# Patient Record
Sex: FEMALE | Race: OTHER | HISPANIC OR LATINO | Employment: UNEMPLOYED | URBAN - NONMETROPOLITAN AREA
[De-identification: names, ages, dates, MRNs, and addresses within clinical notes are randomized per-mention and may not be internally consistent; named-entity substitution may affect disease eponyms.]

---

## 2023-03-06 ENCOUNTER — TELEPHONE (OUTPATIENT)
Dept: PRIMARY CARE | Facility: CLINIC | Age: 53
End: 2023-03-06
Payer: COMMERCIAL

## 2023-03-06 DIAGNOSIS — E78.5 DYSLIPIDEMIA: Primary | ICD-10-CM

## 2023-03-06 DIAGNOSIS — Z13.5 DIABETIC RETINOPATHY SCREENING: ICD-10-CM

## 2023-03-06 RX ORDER — ATORVASTATIN CALCIUM 20 MG/1
20 TABLET, FILM COATED ORAL NIGHTLY
COMMUNITY
Start: 2019-12-09 | End: 2023-03-06 | Stop reason: SDUPTHER

## 2023-03-06 RX ORDER — ATORVASTATIN CALCIUM 20 MG/1
20 TABLET, FILM COATED ORAL NIGHTLY
Qty: 90 TABLET | Refills: 0 | Status: SHIPPED | OUTPATIENT
Start: 2023-03-06 | End: 2023-05-15 | Stop reason: SDUPTHER

## 2023-03-22 PROBLEM — H00.012 HORDEOLUM EXTERNUM OF RIGHT LOWER EYELID: Status: ACTIVE | Noted: 2023-03-22

## 2023-03-22 PROBLEM — G44.209 TENSION HEADACHE: Status: ACTIVE | Noted: 2023-03-22

## 2023-03-22 PROBLEM — J30.9 ALLERGIC RHINITIS: Status: ACTIVE | Noted: 2023-03-22

## 2023-03-22 PROBLEM — R10.11 ABDOMINAL PAIN, ACUTE, RIGHT UPPER QUADRANT: Status: ACTIVE | Noted: 2023-03-22

## 2023-03-22 PROBLEM — L30.9 DERMATITIS: Status: ACTIVE | Noted: 2023-03-22

## 2023-03-22 PROBLEM — E66.09 CLASS 1 OBESITY DUE TO EXCESS CALORIES WITH SERIOUS COMORBIDITY AND BODY MASS INDEX (BMI) OF 32.0 TO 32.9 IN ADULT: Status: ACTIVE | Noted: 2023-03-22

## 2023-03-22 PROBLEM — M99.08 SOMATIC DYSFUNCTION OF RIB: Status: ACTIVE | Noted: 2023-03-22

## 2023-03-22 PROBLEM — L29.9 ITCHING: Status: ACTIVE | Noted: 2023-03-22

## 2023-03-22 PROBLEM — J45.909 ALLERGY-INDUCED ASTHMA (HHS-HCC): Status: ACTIVE | Noted: 2023-03-22

## 2023-03-22 PROBLEM — R07.81 RIB PAIN: Status: ACTIVE | Noted: 2023-03-22

## 2023-03-22 PROBLEM — M94.0 COSTOCHONDRITIS: Status: ACTIVE | Noted: 2023-03-22

## 2023-03-22 PROBLEM — M77.12 LATERAL EPICONDYLITIS OF LEFT ELBOW: Status: ACTIVE | Noted: 2023-03-22

## 2023-03-22 PROBLEM — E66.811 CLASS 1 OBESITY DUE TO EXCESS CALORIES WITH SERIOUS COMORBIDITY AND BODY MASS INDEX (BMI) OF 32.0 TO 32.9 IN ADULT: Status: ACTIVE | Noted: 2023-03-22

## 2023-03-22 PROBLEM — J32.9 SINUSITIS: Status: ACTIVE | Noted: 2023-03-22

## 2023-03-22 PROBLEM — K21.9 ESOPHAGEAL REFLUX: Status: ACTIVE | Noted: 2023-03-22

## 2023-03-22 PROBLEM — R53.82 CHRONIC FATIGUE: Status: ACTIVE | Noted: 2023-03-22

## 2023-03-22 PROBLEM — E04.0 SIMPLE GOITER: Status: ACTIVE | Noted: 2023-03-22

## 2023-03-22 PROBLEM — E56.9 MULTIPLE VITAMIN DEFICIENCY: Status: ACTIVE | Noted: 2023-03-22

## 2023-03-22 PROBLEM — M12.9 ARTHROPATHY: Status: ACTIVE | Noted: 2023-03-22

## 2023-03-22 PROBLEM — G47.00 INSOMNIA: Status: ACTIVE | Noted: 2023-03-22

## 2023-03-22 PROBLEM — K11.20 SIALOADENITIS: Status: ACTIVE | Noted: 2023-03-22

## 2023-03-22 PROBLEM — M99.00 SOMATIC DYSFUNCTION OF OCCIPITOCERVICAL REGION: Status: ACTIVE | Noted: 2023-03-22

## 2023-03-22 PROBLEM — E83.52 HYPERCALCEMIA: Status: ACTIVE | Noted: 2023-03-22

## 2023-03-22 PROBLEM — E55.9 VITAMIN D DEFICIENCY: Status: ACTIVE | Noted: 2023-03-22

## 2023-03-22 PROBLEM — L20.9 ATOPIC DERMATITIS: Status: ACTIVE | Noted: 2023-03-22

## 2023-03-22 PROBLEM — E53.8 VITAMIN B12 DEFICIENCY: Status: ACTIVE | Noted: 2023-03-22

## 2023-03-22 PROBLEM — M65.342 TRIGGER RING FINGER OF LEFT HAND: Status: ACTIVE | Noted: 2023-03-22

## 2023-03-22 PROBLEM — E78.5 DYSLIPIDEMIA: Status: ACTIVE | Noted: 2023-03-22

## 2023-03-22 PROBLEM — E78.49 FAMILIAL COMBINED HYPERLIPIDEMIA: Status: ACTIVE | Noted: 2023-03-22

## 2023-03-22 PROBLEM — H10.9 CONJUNCTIVITIS: Status: ACTIVE | Noted: 2023-03-22

## 2023-03-22 PROBLEM — E11.9 DIABETES MELLITUS (MULTI): Status: ACTIVE | Noted: 2023-03-22

## 2023-03-22 PROBLEM — Z78.9 KNOWN MEDICAL PROBLEMS: Status: ACTIVE | Noted: 2023-03-22

## 2023-03-22 PROBLEM — M19.90 OSTEOARTHROSIS: Status: ACTIVE | Noted: 2023-03-22

## 2023-03-22 PROBLEM — M99.07 SOMATIC DYSFUNCTION OF RIGHT UPPER EXTREMITY: Status: ACTIVE | Noted: 2023-03-22

## 2023-03-22 PROBLEM — M25.511 RIGHT SHOULDER PAIN: Status: ACTIVE | Noted: 2023-03-22

## 2023-03-22 PROBLEM — H92.01 RIGHT EAR PAIN: Status: ACTIVE | Noted: 2023-03-22

## 2023-03-22 PROBLEM — L25.9 CONTACT DERMATITIS: Status: ACTIVE | Noted: 2023-03-22

## 2023-03-22 RX ORDER — HYDROXYZINE HYDROCHLORIDE 10 MG/1
10 TABLET, FILM COATED ORAL 3 TIMES DAILY PRN
COMMUNITY
End: 2023-05-15 | Stop reason: ALTCHOICE

## 2023-03-22 RX ORDER — ERGOCALCIFEROL 1.25 MG/1
CAPSULE ORAL
COMMUNITY
Start: 2021-01-27 | End: 2023-05-15 | Stop reason: ALTCHOICE

## 2023-03-22 RX ORDER — CALCIUM CITRATE/VITAMIN D3 200MG-6.25
TABLET ORAL
COMMUNITY
Start: 2018-09-26

## 2023-03-22 RX ORDER — BACLOFEN 20 MG/1
TABLET ORAL NIGHTLY
COMMUNITY
End: 2023-05-15 | Stop reason: ALTCHOICE

## 2023-03-27 NOTE — PROGRESS NOTES
Subjective     Elizabeth Fragoso is a 52 y.o. female who presents for No chief complaint on file..  This was completed via telephone due to the restrictions of the COVID-19 pandemic.  All issues as below were discussed and addressed but no physical exam was performed.  If it was felt that the patient should be evaluated in clinic then they were director there.  The patient/parent verbally consented to the visit.     HPI  The patient is a 52 year-old female presenting to the clinic complaining  Telemedicine.  Telephone call.  Complaining of congestion sinus pressure facial pain headache and low-grade fever.  Sneezing and clearing throat.  Home COVID test was negative.  Denies shortness of breath.  Over-the-counter medications none.    Review of Systems  Review of systems  General.  Denies fever.  Denies chills.  HEENT dictated as above respiratory.  Dictated as above  Cardiovascular.  Denies chest pain.  Denies heart palpitations.  Denies shortness of breath.    Gastrointestinal.  Denies nausea vomiting diarrhea.  Denies abdominal pain.    Genitourinary denies burning urination.  Denies frequent urination.  Denies flank pain.  Denies blood in the urine.  Denies abnormal vaginal discharge.    Neurology.  Denies tingling numbness but denies weakness.  Denies headache.  Denies blurred vision.    Musculoskeletal.  Denies body aches.  Denies joint pains.  Denies muscle aches.  Denies muscle weakness    Endocrinology.  Denies cold intolerance.  Denies hot intolerance.    Psychiatric.  Denies depression.  Denies anxiety.  Denies suicidal.  Denies homicidal.                  Objective   Virtual visit  Physical Exam  Virtual visit    Assessment/Plan   1.  Allergic rhinitis.  Educated on allergic rhinitis.  Advised to increase oral fluid intake.    2.  Acute sinusitis.  Maxillary sinusitis.  Advised to increase oral fluid intake.  Explained adverse effects of medication.  Advised to call 911 or to go to the emergency room as  soon as possible if develops high fever or shortness of breath.        Problem List Items Addressed This Visit    None      Scribe Attestation  By signing my name below, I, Pradip Martin   attest that this documentation has been prepared under the direction and in the presence of Bobby Og MD.   .

## 2023-03-28 ENCOUNTER — TELEMEDICINE (OUTPATIENT)
Dept: PRIMARY CARE | Facility: CLINIC | Age: 53
End: 2023-03-28
Payer: COMMERCIAL

## 2023-03-28 DIAGNOSIS — J30.89 NON-SEASONAL ALLERGIC RHINITIS DUE TO OTHER ALLERGIC TRIGGER: Primary | ICD-10-CM

## 2023-03-28 DIAGNOSIS — J01.01 ACUTE RECURRENT MAXILLARY SINUSITIS: ICD-10-CM

## 2023-03-28 PROBLEM — H52.13 MYOPIA WITH PRESBYOPIA OF BOTH EYES: Status: ACTIVE | Noted: 2018-05-03

## 2023-03-28 PROBLEM — S93.401A SPRAIN OF RIGHT ANKLE: Status: ACTIVE | Noted: 2017-02-08

## 2023-03-28 PROBLEM — H52.4 MYOPIA WITH PRESBYOPIA OF BOTH EYES: Status: ACTIVE | Noted: 2018-05-03

## 2023-03-28 PROCEDURE — 99212 OFFICE O/P EST SF 10 MIN: CPT | Performed by: FAMILY MEDICINE

## 2023-03-28 RX ORDER — FLUTICASONE PROPIONATE 50 MCG
2 SPRAY, SUSPENSION (ML) NASAL DAILY
Qty: 16 G | Refills: 0 | Status: SHIPPED | OUTPATIENT
Start: 2023-03-28 | End: 2023-05-15 | Stop reason: ALTCHOICE

## 2023-03-28 RX ORDER — CEPHALEXIN 500 MG/1
500 CAPSULE ORAL 3 TIMES DAILY
Qty: 30 CAPSULE | Refills: 0 | Status: SHIPPED | OUTPATIENT
Start: 2023-03-28 | End: 2023-04-07

## 2023-05-12 NOTE — PROGRESS NOTES
Subjective     Elizabeth Fragoso is a 52 y.o. female who presents for Follow-up (ER follow up).      HPI  The patient is a 52 year-old female presenting to the clinic for an ER follow up.  Continue to monitor glucose levels. Educated on diabetes, low-calorie diet and exercise. Recommended eye exam once a year. Educated on diabetic foot care.  Complete blood work ordered 5/15/2023. I have educated the patient on fatigue. The patient denies restless leg syndrome and denies obstructive sleep apnea. Educated on vitamin D deficiency. She is up to date on Colonoscopy.  Order placed for Mammogram 5/15/2023.  Discussed medication.  Take as directed.  Complete blood work after fasting for 10 hours.  Follow up in office.     Educated on type 2 diabetes and diet exercise.  Advised to check blood sugars least twice daily.  Recommend eye exam once a year.  Educated on dyslipidemia and diet and exercise.  Complaining feeling tired.  Advised on diet exercise.  Discussed screening blood work for screening for condition.  Educated on vitamin D deficiency.  Educated on obesity and diet and exercise.  Advised to lose weight.  Discussed mammogram and we will follow-up on the mammogram up-to-date on colonoscopy.  Goes to the podiatrist goes to the ophthalmologist              Review of Systems  Review of systems:    General:  Denies fever.  Denies chills.    HEENT: Denies nasal congestion.  Denies sinus pressure.    Respiratory:  Denies cough.  Denies shortness of breath.    Cardiovascular:  Denies chest pain.  Denies heart palpitations.  Denies shortness of breath.    Gastrointestinal:  Denies nausea vomiting diarrhea.  Denies abdominal pain.    Genitourinary: Denies burning urination.  Denies frequent urination.  Denies flank pain.  Denies blood in the urine.  Denies abnormal vaginal discharge.    Neurology:  Denies tingling numbness but denies weakness.  Denies headache.  Denies blurred vision.    Musculoskeletal:  Denies body aches.   "Denies joint pains.  Denies muscle aches.  Denies muscle weakness    Endocrinology: Dictated as above.    Psychiatric:  Denies depression.  Denies anxiety.  Denies suicidal.  Denies homicidal.      Objective   /78 (BP Location: Left arm, Patient Position: Sitting, BP Cuff Size: Adult)   Pulse 76   Ht 1.626 m (5' 4\")   Wt 83 kg (183 lb)   SpO2 98%   BMI 31.41 kg/m²          Physical Exam  General.  Not in distress.  HEENT normocephalic anicteric sclerae.  Neck soft supple no thyromegaly.  No carotid bruit.  Lungs are clear.  Heart regular.  Abdomen soft nontender nondistended bowel sounds are positive.  Extremities no clubbing cyanosis or edema.  Psychiatric.  Has good eye contact.  No crying spells noted.  Speech was normal.  Denies depression.  Denies suicidal.  Denies homicidal.  Foot exam.  Bilateral foot exam.  No rashes noted.  No lesions noted.  No ulcers noted.  No onychomycosis noted.  Sensation was intact with a monofilament.  Bilateral posterior tibial and dorsalis pedis arteries are palpable      Assessment/Plan     1.  Type 2 diabetes.  Uncontrolled.  Educated on diet exercise but advised to check blood sugars at least twice daily.  Recommend eye exam once a year.  Educated on diabetic foot care.  We will continue monitor.    2.  Dyslipidemia.  Educated on diet exercise we will continue monitor    3.  Fatigue.  Educated on diet exercise.  Advised to lose weight.    4..  Vitamin D deficiency D deficiency we will continue monitor    5.  Obesity.  Educated on diet exercise.  Advised to lose weight.  We will continue monitor weight loss                Problem List Items Addressed This Visit          Endocrine/Metabolic    Diabetes mellitus (CMS/[facility]) - Primary    Relevant Medications    canagliflozin (Invokana) 300 mg    FreeStyle Bubba sensor system (FreeStyle Bubba 2 Sensor) kit    Other Relevant Orders    Urinalysis with Reflex Microscopic    Hemoglobin A1C    Albumin , Urine Random    Vitamin D " deficiency    Relevant Orders    Vitamin D 25-Hydroxy,Total    Class 1 obesity due to excess calories with serious comorbidity and body mass index (BMI) of 32.0 to 32.9 in adult       Other    Chronic fatigue    Relevant Orders    CBC and Auto Differential    Tsh With Reflex To Free T4 If Abnormal    Vitamin B12    Folate    Dyslipidemia    Relevant Medications    atorvastatin (Lipitor) 20 mg tablet    Other Relevant Orders    Comprehensive metabolic panel    Lipid panel     Other Visit Diagnoses       Encounter for screening mammogram for malignant neoplasm of breast        Relevant Orders    BI mammo bilateral screening tomosynthesis    Screening for condition        Relevant Orders    HIV 1/2 antibodies, rapid    Hepatitis C antibody              Scribe Attestation  By signing my name below, Sarah RODRIGUEZ Scribe   attest that this documentation has been prepared under the direction and in the presence of Bobby Og MD.

## 2023-05-15 ENCOUNTER — LAB (OUTPATIENT)
Dept: LAB | Facility: LAB | Age: 53
End: 2023-05-15
Payer: COMMERCIAL

## 2023-05-15 ENCOUNTER — OFFICE VISIT (OUTPATIENT)
Dept: PRIMARY CARE | Facility: CLINIC | Age: 53
End: 2023-05-15
Payer: COMMERCIAL

## 2023-05-15 VITALS
BODY MASS INDEX: 31.24 KG/M2 | SYSTOLIC BLOOD PRESSURE: 119 MMHG | HEIGHT: 64 IN | HEART RATE: 76 BPM | OXYGEN SATURATION: 98 % | DIASTOLIC BLOOD PRESSURE: 78 MMHG | WEIGHT: 183 LBS

## 2023-05-15 DIAGNOSIS — E78.5 DYSLIPIDEMIA: ICD-10-CM

## 2023-05-15 DIAGNOSIS — E11.9 TYPE 2 DIABETES MELLITUS WITHOUT COMPLICATION, WITHOUT LONG-TERM CURRENT USE OF INSULIN (MULTI): ICD-10-CM

## 2023-05-15 DIAGNOSIS — E55.9 VITAMIN D DEFICIENCY: ICD-10-CM

## 2023-05-15 DIAGNOSIS — Z12.31 ENCOUNTER FOR SCREENING MAMMOGRAM FOR MALIGNANT NEOPLASM OF BREAST: ICD-10-CM

## 2023-05-15 DIAGNOSIS — E66.09 CLASS 1 OBESITY DUE TO EXCESS CALORIES WITH SERIOUS COMORBIDITY AND BODY MASS INDEX (BMI) OF 32.0 TO 32.9 IN ADULT: ICD-10-CM

## 2023-05-15 DIAGNOSIS — Z13.9 SCREENING FOR CONDITION: ICD-10-CM

## 2023-05-15 DIAGNOSIS — E11.9 TYPE 2 DIABETES MELLITUS WITHOUT COMPLICATION, WITHOUT LONG-TERM CURRENT USE OF INSULIN (MULTI): Primary | ICD-10-CM

## 2023-05-15 DIAGNOSIS — R53.82 CHRONIC FATIGUE: ICD-10-CM

## 2023-05-15 LAB
ALANINE AMINOTRANSFERASE (SGPT) (U/L) IN SER/PLAS: 24 U/L (ref 7–45)
ALBUMIN (G/DL) IN SER/PLAS: 3.9 G/DL (ref 3.4–5)
ALKALINE PHOSPHATASE (U/L) IN SER/PLAS: 80 U/L (ref 33–110)
ANION GAP IN SER/PLAS: 13 MMOL/L (ref 10–20)
APPEARANCE, URINE: ABNORMAL
ASPARTATE AMINOTRANSFERASE (SGOT) (U/L) IN SER/PLAS: 17 U/L (ref 9–39)
BACTERIA, URINE: ABNORMAL /HPF
BASOPHILS (10*3/UL) IN BLOOD BY AUTOMATED COUNT: 0.02 X10E9/L (ref 0–0.1)
BASOPHILS/100 LEUKOCYTES IN BLOOD BY AUTOMATED COUNT: 0.4 % (ref 0–2)
BILIRUBIN TOTAL (MG/DL) IN SER/PLAS: 0.4 MG/DL (ref 0–1.2)
BILIRUBIN, URINE: NEGATIVE
BLOOD, URINE: NEGATIVE
CALCIUM (MG/DL) IN SER/PLAS: 9.3 MG/DL (ref 8.6–10.3)
CARBON DIOXIDE, TOTAL (MMOL/L) IN SER/PLAS: 28 MMOL/L (ref 21–32)
CHLORIDE (MMOL/L) IN SER/PLAS: 103 MMOL/L (ref 98–107)
CHOLESTEROL (MG/DL) IN SER/PLAS: 185 MG/DL (ref 0–199)
CHOLESTEROL IN HDL (MG/DL) IN SER/PLAS: 78.2 MG/DL
CHOLESTEROL/HDL RATIO: 2.4
COLOR, URINE: YELLOW
CREATININE (MG/DL) IN SER/PLAS: 0.73 MG/DL (ref 0.5–1.05)
EOSINOPHILS (10*3/UL) IN BLOOD BY AUTOMATED COUNT: 0.14 X10E9/L (ref 0–0.7)
EOSINOPHILS/100 LEUKOCYTES IN BLOOD BY AUTOMATED COUNT: 2.5 % (ref 0–6)
ERYTHROCYTE DISTRIBUTION WIDTH (RATIO) BY AUTOMATED COUNT: 13.4 % (ref 11.5–14.5)
ERYTHROCYTE MEAN CORPUSCULAR HEMOGLOBIN CONCENTRATION (G/DL) BY AUTOMATED: 30.3 G/DL (ref 32–36)
ERYTHROCYTE MEAN CORPUSCULAR VOLUME (FL) BY AUTOMATED COUNT: 96 FL (ref 80–100)
ERYTHROCYTES (10*6/UL) IN BLOOD BY AUTOMATED COUNT: 4.55 X10E12/L (ref 4–5.2)
GFR FEMALE: >90 ML/MIN/1.73M2
GLUCOSE (MG/DL) IN SER/PLAS: 126 MG/DL (ref 74–99)
GLUCOSE, URINE: ABNORMAL MG/DL
HEMATOCRIT (%) IN BLOOD BY AUTOMATED COUNT: 43.5 % (ref 36–46)
HEMOGLOBIN (G/DL) IN BLOOD: 13.2 G/DL (ref 12–16)
HYALINE CASTS, URINE: ABNORMAL /LPF
IMMATURE GRANULOCYTES/100 LEUKOCYTES IN BLOOD BY AUTOMATED COUNT: 1.1 % (ref 0–0.9)
KETONES, URINE: NEGATIVE MG/DL
LDL: 80 MG/DL (ref 0–99)
LEUKOCYTE ESTERASE, URINE: ABNORMAL
LEUKOCYTES (10*3/UL) IN BLOOD BY AUTOMATED COUNT: 5.6 X10E9/L (ref 4.4–11.3)
LYMPHOCYTES (10*3/UL) IN BLOOD BY AUTOMATED COUNT: 1.65 X10E9/L (ref 1.2–4.8)
LYMPHOCYTES/100 LEUKOCYTES IN BLOOD BY AUTOMATED COUNT: 29.3 % (ref 13–44)
MONOCYTES (10*3/UL) IN BLOOD BY AUTOMATED COUNT: 0.37 X10E9/L (ref 0.1–1)
MONOCYTES/100 LEUKOCYTES IN BLOOD BY AUTOMATED COUNT: 6.6 % (ref 2–10)
MUCUS, URINE: ABNORMAL /LPF
NEUTROPHILS (10*3/UL) IN BLOOD BY AUTOMATED COUNT: 3.39 X10E9/L (ref 1.2–7.7)
NEUTROPHILS/100 LEUKOCYTES IN BLOOD BY AUTOMATED COUNT: 60.1 % (ref 40–80)
NITRITE, URINE: NEGATIVE
PH, URINE: 5 (ref 5–8)
PLATELETS (10*3/UL) IN BLOOD AUTOMATED COUNT: 291 X10E9/L (ref 150–450)
POTASSIUM (MMOL/L) IN SER/PLAS: 4.6 MMOL/L (ref 3.5–5.3)
PROTEIN TOTAL: 7.2 G/DL (ref 6.4–8.2)
PROTEIN, URINE: NEGATIVE MG/DL
RBC, URINE: 2 /HPF (ref 0–5)
SODIUM (MMOL/L) IN SER/PLAS: 139 MMOL/L (ref 136–145)
SPECIFIC GRAVITY, URINE: 1.03 (ref 1–1.03)
SQUAMOUS EPITHELIAL CELLS, URINE: 16 /HPF
THYROTROPIN (MIU/L) IN SER/PLAS BY DETECTION LIMIT <= 0.05 MIU/L: 1.15 MIU/L (ref 0.44–3.98)
TRANSITIONAL EPITHELIAL CELLS, URINE: <1 /HPF
TRIGLYCERIDE (MG/DL) IN SER/PLAS: 136 MG/DL (ref 0–149)
UREA NITROGEN (MG/DL) IN SER/PLAS: 11 MG/DL (ref 6–23)
UROBILINOGEN, URINE: <2 MG/DL (ref 0–1.9)
VLDL: 27 MG/DL (ref 0–40)
WBC, URINE: 11 /HPF (ref 0–5)

## 2023-05-15 PROCEDURE — 81001 URINALYSIS AUTO W/SCOPE: CPT

## 2023-05-15 PROCEDURE — 80061 LIPID PANEL: CPT

## 2023-05-15 PROCEDURE — 36415 COLL VENOUS BLD VENIPUNCTURE: CPT

## 2023-05-15 PROCEDURE — 3008F BODY MASS INDEX DOCD: CPT | Performed by: FAMILY MEDICINE

## 2023-05-15 PROCEDURE — 82607 VITAMIN B-12: CPT

## 2023-05-15 PROCEDURE — 83036 HEMOGLOBIN GLYCOSYLATED A1C: CPT

## 2023-05-15 PROCEDURE — 3078F DIAST BP <80 MM HG: CPT | Performed by: FAMILY MEDICINE

## 2023-05-15 PROCEDURE — 84443 ASSAY THYROID STIM HORMONE: CPT

## 2023-05-15 PROCEDURE — 99214 OFFICE O/P EST MOD 30 MIN: CPT | Performed by: FAMILY MEDICINE

## 2023-05-15 PROCEDURE — 82306 VITAMIN D 25 HYDROXY: CPT

## 2023-05-15 PROCEDURE — 82570 ASSAY OF URINE CREATININE: CPT

## 2023-05-15 PROCEDURE — 82043 UR ALBUMIN QUANTITATIVE: CPT

## 2023-05-15 PROCEDURE — 3051F HG A1C>EQUAL 7.0%<8.0%: CPT | Performed by: FAMILY MEDICINE

## 2023-05-15 PROCEDURE — 85025 COMPLETE CBC W/AUTO DIFF WBC: CPT

## 2023-05-15 PROCEDURE — 1036F TOBACCO NON-USER: CPT | Performed by: FAMILY MEDICINE

## 2023-05-15 PROCEDURE — 82746 ASSAY OF FOLIC ACID SERUM: CPT

## 2023-05-15 PROCEDURE — 3074F SYST BP LT 130 MM HG: CPT | Performed by: FAMILY MEDICINE

## 2023-05-15 PROCEDURE — 86803 HEPATITIS C AB TEST: CPT

## 2023-05-15 PROCEDURE — 80053 COMPREHEN METABOLIC PANEL: CPT

## 2023-05-15 PROCEDURE — 87389 HIV-1 AG W/HIV-1&-2 AB AG IA: CPT

## 2023-05-15 RX ORDER — ATORVASTATIN CALCIUM 20 MG/1
20 TABLET, FILM COATED ORAL NIGHTLY
Qty: 90 TABLET | Refills: 0 | Status: SHIPPED | OUTPATIENT
Start: 2023-05-15 | End: 2024-02-28 | Stop reason: SDUPTHER

## 2023-05-15 RX ORDER — FLASH GLUCOSE SENSOR
KIT MISCELLANEOUS
Qty: 2 EACH | Refills: 1 | Status: SHIPPED | OUTPATIENT
Start: 2023-05-15 | End: 2023-06-17

## 2023-05-15 ASSESSMENT — PAIN SCALES - GENERAL: PAINLEVEL: 0-NO PAIN

## 2023-05-16 LAB
ALBUMIN (MG/L) IN URINE: 7.2 MG/L
ALBUMIN/CREATININE (UG/MG) IN URINE: 10.4 UG/MG CRT (ref 0–30)
CALCIDIOL (25 OH VITAMIN D3) (NG/ML) IN SER/PLAS: 34 NG/ML
COBALAMIN (VITAMIN B12) (PG/ML) IN SER/PLAS: 383 PG/ML (ref 211–911)
CREATININE (MG/DL) IN URINE: 69.4 MG/DL (ref 20–320)
ESTIMATED AVERAGE GLUCOSE FOR HBA1C: 174 MG/DL
FOLATE (NG/ML) IN SER/PLAS: 14.4 NG/ML
HEMOGLOBIN A1C/HEMOGLOBIN TOTAL IN BLOOD: 7.7 %
HEPATITIS C VIRUS AB PRESENCE IN SERUM: NONREACTIVE
HIV 1/ 2 AG/AB SCREEN: NONREACTIVE

## 2023-09-13 LAB
ALANINE AMINOTRANSFERASE (SGPT) (U/L) IN SER/PLAS: 22 U/L (ref 7–45)
ALBUMIN (G/DL) IN SER/PLAS: 4.2 G/DL (ref 3.4–5)
ALKALINE PHOSPHATASE (U/L) IN SER/PLAS: 80 U/L (ref 33–110)
ANION GAP IN SER/PLAS: 11 MMOL/L (ref 10–20)
APPEARANCE, URINE: ABNORMAL
ASPARTATE AMINOTRANSFERASE (SGOT) (U/L) IN SER/PLAS: 19 U/L (ref 9–39)
BASOPHILS (10*3/UL) IN BLOOD BY AUTOMATED COUNT: 0.04 X10E9/L (ref 0–0.1)
BASOPHILS/100 LEUKOCYTES IN BLOOD BY AUTOMATED COUNT: 0.5 % (ref 0–2)
BILIRUBIN TOTAL (MG/DL) IN SER/PLAS: 0.5 MG/DL (ref 0–1.2)
BILIRUBIN, URINE: NEGATIVE
BLOOD, URINE: NEGATIVE
CALCIUM (MG/DL) IN SER/PLAS: 9.9 MG/DL (ref 8.6–10.3)
CARBON DIOXIDE, TOTAL (MMOL/L) IN SER/PLAS: 29 MMOL/L (ref 21–32)
CHLORIDE (MMOL/L) IN SER/PLAS: 102 MMOL/L (ref 98–107)
CHOLESTEROL (MG/DL) IN SER/PLAS: 186 MG/DL (ref 0–199)
CHOLESTEROL IN HDL (MG/DL) IN SER/PLAS: 73.8 MG/DL
CHOLESTEROL/HDL RATIO: 2.5
COLOR, URINE: YELLOW
CREATININE (MG/DL) IN SER/PLAS: 0.75 MG/DL (ref 0.5–1.05)
EOSINOPHILS (10*3/UL) IN BLOOD BY AUTOMATED COUNT: 0.08 X10E9/L (ref 0–0.7)
EOSINOPHILS/100 LEUKOCYTES IN BLOOD BY AUTOMATED COUNT: 1 % (ref 0–6)
ERYTHROCYTE DISTRIBUTION WIDTH (RATIO) BY AUTOMATED COUNT: 13.4 % (ref 11.5–14.5)
ERYTHROCYTE MEAN CORPUSCULAR HEMOGLOBIN CONCENTRATION (G/DL) BY AUTOMATED: 30.5 G/DL (ref 32–36)
ERYTHROCYTE MEAN CORPUSCULAR VOLUME (FL) BY AUTOMATED COUNT: 94 FL (ref 80–100)
ERYTHROCYTES (10*6/UL) IN BLOOD BY AUTOMATED COUNT: 4.46 X10E12/L (ref 4–5.2)
GFR FEMALE: >90 ML/MIN/1.73M2
GLUCOSE (MG/DL) IN SER/PLAS: 108 MG/DL (ref 74–99)
GLUCOSE, URINE: ABNORMAL MG/DL
HEMATOCRIT (%) IN BLOOD BY AUTOMATED COUNT: 42 % (ref 36–46)
HEMOGLOBIN (G/DL) IN BLOOD: 12.8 G/DL (ref 12–16)
IMMATURE GRANULOCYTES/100 LEUKOCYTES IN BLOOD BY AUTOMATED COUNT: 0.1 % (ref 0–0.9)
KETONES, URINE: ABNORMAL MG/DL
LDL: 96 MG/DL (ref 0–99)
LEUKOCYTE ESTERASE, URINE: ABNORMAL
LEUKOCYTES (10*3/UL) IN BLOOD BY AUTOMATED COUNT: 8.2 X10E9/L (ref 4.4–11.3)
LYMPHOCYTES (10*3/UL) IN BLOOD BY AUTOMATED COUNT: 2.2 X10E9/L (ref 1.2–4.8)
LYMPHOCYTES/100 LEUKOCYTES IN BLOOD BY AUTOMATED COUNT: 27 % (ref 13–44)
MONOCYTES (10*3/UL) IN BLOOD BY AUTOMATED COUNT: 0.55 X10E9/L (ref 0.1–1)
MONOCYTES/100 LEUKOCYTES IN BLOOD BY AUTOMATED COUNT: 6.7 % (ref 2–10)
MUCUS, URINE: ABNORMAL /LPF
NEUTROPHILS (10*3/UL) IN BLOOD BY AUTOMATED COUNT: 5.27 X10E9/L (ref 1.2–7.7)
NEUTROPHILS/100 LEUKOCYTES IN BLOOD BY AUTOMATED COUNT: 64.7 % (ref 40–80)
NITRITE, URINE: NEGATIVE
PH, URINE: 5 (ref 5–8)
PLATELETS (10*3/UL) IN BLOOD AUTOMATED COUNT: 285 X10E9/L (ref 150–450)
POTASSIUM (MMOL/L) IN SER/PLAS: 4.4 MMOL/L (ref 3.5–5.3)
PROTEIN TOTAL: 7.5 G/DL (ref 6.4–8.2)
PROTEIN, URINE: NEGATIVE MG/DL
RBC, URINE: <1 /HPF (ref 0–5)
SODIUM (MMOL/L) IN SER/PLAS: 138 MMOL/L (ref 136–145)
SPECIFIC GRAVITY, URINE: 1.03 (ref 1–1.03)
SQUAMOUS EPITHELIAL CELLS, URINE: 16 /HPF
THYROTROPIN (MIU/L) IN SER/PLAS BY DETECTION LIMIT <= 0.05 MIU/L: 2.82 MIU/L (ref 0.44–3.98)
TRIGLYCERIDE (MG/DL) IN SER/PLAS: 83 MG/DL (ref 0–149)
UREA NITROGEN (MG/DL) IN SER/PLAS: 18 MG/DL (ref 6–23)
UROBILINOGEN, URINE: <2 MG/DL (ref 0–1.9)
VLDL: 17 MG/DL (ref 0–40)
WBC, URINE: 22 /HPF (ref 0–5)

## 2023-09-14 LAB
ALBUMIN (MG/L) IN URINE: 11.8 MG/L
ALBUMIN/CREATININE (UG/MG) IN URINE: 10.9 UG/MG CRT (ref 0–30)
CALCIDIOL (25 OH VITAMIN D3) (NG/ML) IN SER/PLAS: 42 NG/ML
COBALAMIN (VITAMIN B12) (PG/ML) IN SER/PLAS: 414 PG/ML (ref 211–911)
CREATININE (MG/DL) IN URINE: 108 MG/DL (ref 20–320)
ESTIMATED AVERAGE GLUCOSE FOR HBA1C: 177 MG/DL
FOLATE (NG/ML) IN SER/PLAS: >24 NG/ML
HEMOGLOBIN A1C/HEMOGLOBIN TOTAL IN BLOOD: 7.8 %

## 2023-10-23 ENCOUNTER — ANCILLARY PROCEDURE (OUTPATIENT)
Dept: RADIOLOGY | Facility: CLINIC | Age: 53
End: 2023-10-23
Payer: COMMERCIAL

## 2023-10-23 ENCOUNTER — OFFICE VISIT (OUTPATIENT)
Dept: PRIMARY CARE | Facility: CLINIC | Age: 53
End: 2023-10-23
Payer: COMMERCIAL

## 2023-10-23 ENCOUNTER — LAB (OUTPATIENT)
Dept: LAB | Facility: LAB | Age: 53
End: 2023-10-23
Payer: COMMERCIAL

## 2023-10-23 VITALS
HEIGHT: 64 IN | BODY MASS INDEX: 29.88 KG/M2 | SYSTOLIC BLOOD PRESSURE: 137 MMHG | WEIGHT: 175 LBS | DIASTOLIC BLOOD PRESSURE: 86 MMHG | OXYGEN SATURATION: 96 % | HEART RATE: 75 BPM

## 2023-10-23 DIAGNOSIS — M25.561 RIGHT KNEE PAIN, UNSPECIFIED CHRONICITY: ICD-10-CM

## 2023-10-23 DIAGNOSIS — H60.91 OTITIS EXTERNA OF RIGHT EAR, UNSPECIFIED CHRONICITY, UNSPECIFIED TYPE: ICD-10-CM

## 2023-10-23 DIAGNOSIS — M25.552 BILATERAL HIP PAIN: ICD-10-CM

## 2023-10-23 DIAGNOSIS — E11.9 TYPE 2 DIABETES MELLITUS WITHOUT COMPLICATION, WITHOUT LONG-TERM CURRENT USE OF INSULIN (MULTI): ICD-10-CM

## 2023-10-23 DIAGNOSIS — E78.5 DYSLIPIDEMIA: ICD-10-CM

## 2023-10-23 DIAGNOSIS — M35.3 POLYMYALGIA (MULTI): ICD-10-CM

## 2023-10-23 DIAGNOSIS — M25.551 BILATERAL HIP PAIN: ICD-10-CM

## 2023-10-23 PROCEDURE — 3079F DIAST BP 80-89 MM HG: CPT | Performed by: INTERNAL MEDICINE

## 2023-10-23 PROCEDURE — 3008F BODY MASS INDEX DOCD: CPT | Performed by: INTERNAL MEDICINE

## 2023-10-23 PROCEDURE — 36415 COLL VENOUS BLD VENIPUNCTURE: CPT

## 2023-10-23 PROCEDURE — 72202 X-RAY EXAM SI JOINTS 3/> VWS: CPT | Performed by: RADIOLOGY

## 2023-10-23 PROCEDURE — 86038 ANTINUCLEAR ANTIBODIES: CPT

## 2023-10-23 PROCEDURE — 99214 OFFICE O/P EST MOD 30 MIN: CPT | Performed by: INTERNAL MEDICINE

## 2023-10-23 PROCEDURE — 72202 X-RAY EXAM SI JOINTS 3/> VWS: CPT

## 2023-10-23 PROCEDURE — 85652 RBC SED RATE AUTOMATED: CPT

## 2023-10-23 PROCEDURE — 73562 X-RAY EXAM OF KNEE 3: CPT | Mod: RT

## 2023-10-23 PROCEDURE — 86140 C-REACTIVE PROTEIN: CPT

## 2023-10-23 PROCEDURE — 86431 RHEUMATOID FACTOR QUANT: CPT

## 2023-10-23 PROCEDURE — 3051F HG A1C>EQUAL 7.0%<8.0%: CPT | Performed by: INTERNAL MEDICINE

## 2023-10-23 PROCEDURE — 73562 X-RAY EXAM OF KNEE 3: CPT | Mod: RIGHT SIDE | Performed by: RADIOLOGY

## 2023-10-23 PROCEDURE — 3075F SYST BP GE 130 - 139MM HG: CPT | Performed by: INTERNAL MEDICINE

## 2023-10-23 PROCEDURE — 1036F TOBACCO NON-USER: CPT | Performed by: INTERNAL MEDICINE

## 2023-10-23 RX ORDER — MELOXICAM 15 MG/1
15 TABLET ORAL DAILY
Qty: 30 TABLET | Refills: 0 | Status: SHIPPED | OUTPATIENT
Start: 2023-10-23 | End: 2023-11-22

## 2023-10-23 RX ORDER — LANOLIN ALCOHOL/MO/W.PET/CERES
500 CREAM (GRAM) TOPICAL DAILY
COMMUNITY

## 2023-10-23 RX ORDER — CIPROFLOXACIN AND DEXAMETHASONE 3; 1 MG/ML; MG/ML
4 SUSPENSION/ DROPS AURICULAR (OTIC) 2 TIMES DAILY
Qty: 4 ML | Refills: 0 | Status: SHIPPED | OUTPATIENT
Start: 2023-10-23 | End: 2023-11-07 | Stop reason: WASHOUT

## 2023-10-23 RX ORDER — FLASH GLUCOSE SENSOR
KIT MISCELLANEOUS
Qty: 2 EACH | Refills: 3 | Status: SHIPPED | OUTPATIENT
Start: 2023-10-23 | End: 2024-03-25

## 2023-10-23 ASSESSMENT — PATIENT HEALTH QUESTIONNAIRE - PHQ9
1. LITTLE INTEREST OR PLEASURE IN DOING THINGS: NOT AT ALL
SUM OF ALL RESPONSES TO PHQ9 QUESTIONS 1 AND 2: 0
2. FEELING DOWN, DEPRESSED OR HOPELESS: NOT AT ALL

## 2023-10-23 ASSESSMENT — ENCOUNTER SYMPTOMS
CARDIOVASCULAR NEGATIVE: 1
GASTROINTESTINAL NEGATIVE: 1
ARTHRALGIAS: 1
RESPIRATORY NEGATIVE: 1
NEUROLOGICAL NEGATIVE: 1

## 2023-10-23 NOTE — PROGRESS NOTES
Patient ID: She states that she has been having pain in bilateral hips, right shoulder which has persisted for about one month. She states it is painful to sit, stand and walk. She states the pain becomes very severe at times. She states her eldest son has diagnosis of lupus. She states that she had UTI symptoms about two months ago, took OTC medication and symptoms resolved. Denies any current urinary symptoms. She also reports pain in right ear, denies swimming.    HPI Elizabeth Fragoso is a 52 y.o. female with PMH remarkable for Type 2 DM, dyslipidemia, vitamin D deficiency who presents to the office today for Establish Care (Switching from joe) and Flu Vaccine (Pt declined at this time).    HEALTH MAINTENANCE: Establish Care  Previous PCP: Dr. Og  Smoking: never smoker  Mammogram (40-75): 5/24/23 WNL  Pap smear (21-65):   Labs: 9/13/23  Colonoscopy (45-75): 7/27/21  Lung cancer screening (55-80 + 30 pack year + smoking/quit in last 15 years): n/a  DEXA (65+, q 2 years): n/a    SOCIAL HISTORY:  Social History     Tobacco Use    Smoking status: Never    Smokeless tobacco: Never   Substance Use Topics    Alcohol use: Not Currently     Comment: occasionally    Drug use: Never     IMMUNIZATIONS:  Immunization History   Administered Date(s) Administered    Pfizer Purple Cap SARS-CoV-2 09/30/2021, 10/21/2021     REVIEW OF SYSTEMS:  Review of Systems   HENT:  Positive for ear pain.    Respiratory: Negative.     Cardiovascular: Negative.    Gastrointestinal: Negative.    Genitourinary: Negative.    Musculoskeletal:  Positive for arthralgias.   Neurological: Negative.      ALLERGIES:  No Known Allergies     VITAL SIGNS:  Vitals:    10/23/23 1512   BP: 137/86   Pulse: 75   SpO2: 96%     Physical Exam  Vitals reviewed.   Constitutional:       Appearance: Normal appearance.   HENT:      Head: Normocephalic and atraumatic.      Ears:      Comments: There was edema and erythema to right external ear, TM normal,  no drainage  Cardiovascular:      Rate and Rhythm: Normal rate and regular rhythm.      Pulses: Normal pulses.   Pulmonary:      Effort: Pulmonary effort is normal.      Breath sounds: Normal breath sounds.   Abdominal:      General: Bowel sounds are normal.      Palpations: Abdomen is soft.   Musculoskeletal:      Comments: OSWALDO produced pain in posterior part of hip and sacroiliac bone and there was also tenderness over sacroiliac bone bilaterally, no tenderness over spine  There was tenderness and pain with any ROM on left knee, no edema, no erythema or warmth  Tenderness and mild effusion over right knee   Neurological:      General: No focal deficit present.      Mental Status: She is alert and oriented to person, place, and time.   Psychiatric:         Mood and Affect: Mood normal.         Behavior: Behavior normal.     MEDICATIONS:  Current Outpatient Medications on File Prior to Visit   Medication Sig Dispense Refill    ascorbic acid (Vitamin C) 500 mg ER capsule Take 1 capsule (500 mg) by mouth once daily.      atorvastatin (Lipitor) 20 mg tablet Take 1 tablet (20 mg) by mouth once daily at bedtime. 90 tablet 0    canagliflozin (Invokana) 300 mg Take 1 tablet (300 mg) by mouth once daily. 90 tablet 0    FreeStyle Bubba sensor system (FreeStyle Bubba 2 Sensor) kit USE AS INSTRUCTED 2 each 0    blood sugar diagnostic (True Metrix Glucose Test Strip) strip USE 1 STRIP Daily       No current facility-administered medications on file prior to visit.      LABORATORY DATA:  Lab Results   Component Value Date    WBC 8.2 09/13/2023    HGB 12.8 09/13/2023    HCT 42.0 09/13/2023     09/13/2023    CHOL 186 09/13/2023    TRIG 83 09/13/2023    HDL 73.8 09/13/2023    ALT 22 09/13/2023    AST 19 09/13/2023     09/13/2023    K 4.4 09/13/2023     09/13/2023    CREATININE 0.75 09/13/2023    BUN 18 09/13/2023    CO2 29 09/13/2023    TSH 2.82 09/13/2023    HGBA1C 7.8 (A) 09/13/2023       ASSESSMENT AND  PLAN:  Assessment/Plan   Elizabeth Fragoso is a 52 y.o. female with PMH remarkable for Type 2 DM, dyslipidemia, vitamin D deficiency here to establish care and with complaint of bone pain  - reviewed most recent bloodwork from 09/13/23  - her HgbA1c on 09/13/23 was7.8  - continue with Invokana and blood sugar monitoring  - most recent lipid panel on 09/13/23 was normal , continue with atorvastatin  - she has complaint of right ear pain, exam consistent with external otitis media, start Ciprodex ear drops  - she has significant pain over sacroiliac joint, denies any trauma or falls  - she has a lot of joint pain, including bilateral knees and hips  - will get xrays of bilateral knees and sacroiliac area  - start on Meloxicam to help with pain, prescription sent in, advised to avoid NSAIDS while taking this medication  - will check inflammatory markers due to polyarthralgia, orders inputted for IFRAH, CRP, ESR, Rheumatoid factor  - Follow up in three months    --------------------  Written by Kristi Galindo LPN, acting as a scribe for Dr. Noel. This note accurately reflects the work and decisions made by Dr. Noel.     I, Dr. Noel, attest all medical record entries made by the scribe were under my direction and were personally dictated by me. I have reviewed the chart and agree that the record accurately reflects my performance of the history, physical exam, and assessment and plan.

## 2023-10-23 NOTE — PATIENT INSTRUCTIONS
It was great to see you in the office today! Here is what we discussed at your visit today:  We have placed referral for xrays. Please have done as soon as you are able. We will call you with results  We have placed referral for labwork, please have drawn as soon as you are able  We sent in prescription ear drops for your right ear. Take as directed  We sent in prescription for medication for your pain. Take as directed  DO not take any NSAIDS with this medication, including Ibuprofen, Motrin, Naproxen or Aleve  Follow up in three months

## 2023-10-24 LAB
CRP SERPL-MCNC: 6.09 MG/DL
ERYTHROCYTE [SEDIMENTATION RATE] IN BLOOD BY WESTERGREN METHOD: 25 MM/H (ref 0–30)
RHEUMATOID FACT SER NEPH-ACNC: <10 IU/ML (ref 0–15)

## 2023-10-25 LAB — ANA SER QL HEP2 SUBST: NEGATIVE

## 2023-11-07 ENCOUNTER — OFFICE VISIT (OUTPATIENT)
Dept: OTOLARYNGOLOGY | Facility: CLINIC | Age: 53
End: 2023-11-07
Payer: COMMERCIAL

## 2023-11-07 DIAGNOSIS — H93.13 TINNITUS OF BOTH EARS: Primary | ICD-10-CM

## 2023-11-07 PROCEDURE — 3051F HG A1C>EQUAL 7.0%<8.0%: CPT | Performed by: OTOLARYNGOLOGY

## 2023-11-07 PROCEDURE — 3075F SYST BP GE 130 - 139MM HG: CPT | Performed by: OTOLARYNGOLOGY

## 2023-11-07 PROCEDURE — 3008F BODY MASS INDEX DOCD: CPT | Performed by: OTOLARYNGOLOGY

## 2023-11-07 PROCEDURE — 3079F DIAST BP 80-89 MM HG: CPT | Performed by: OTOLARYNGOLOGY

## 2023-11-07 PROCEDURE — 1036F TOBACCO NON-USER: CPT | Performed by: OTOLARYNGOLOGY

## 2023-11-07 PROCEDURE — 99203 OFFICE O/P NEW LOW 30 MIN: CPT | Performed by: OTOLARYNGOLOGY

## 2023-11-07 NOTE — PROGRESS NOTES
Chief Complaint          History of Present Illness    11.07.2023: Sometimes she has tinnitus in her ears, for the past 2-3 months. More on the right side. No change in hearing. She is annoyed by loud sounds. Dizziness (happened twice, lasted seconds).    No new medicine, she is taking atorvastatin and meloxixam  which may cause tinnitus.   Sometimes ear pain (mild).   No ear fullness.  Ears essentially look normal on exam.    Plan:  1- hearing test, follow up after the test    _________________________________________________________________      Ms. Fragoso is a 48 yo F. She is referred by Dr. Cadet for enlarged lymph node in her neck. Few months ago she noticed a left neck mass. She had an USG. It has shown a lymph node. She had a follow up USG two months later and then she was referred to ENT.      Review of Systems   below is her old ROS, she does not have fever today.  Constitutional: no fever, not feeling tired, no recent weight gain and no recent weight loss.   Eyes: no eye pain, no double vision and no itching of the eyes.   ENMT: no difficulty with hearing, no hearing loss, no pain in the ear, no vertigo, no tinnitus, the ears do not feel full, no discharge from the ears, no nosebleeds, no nasal congestion, no rhinorrhea, no sinus pressure, no nasal blockage/obstruction, no snoring, no postnasal drip, no sore throat, no hoarseness, the gums were not bleeding, no dry mouth, no dysphagia and no mouth ulcers.   Cardiovascular: no history of murmur, no chest pain, no palpitations and no lower extremity edema.   Respiratory: no shortness of breath, no dyspnea during exertion, no wheezing, not coughing up sputum and not coughing up blood.   Gastrointestinal: no heartburn, no vomiting, no change in appetite and no pain on swallowing.   Genitourinary: no dysuria and no difficulty urinating.   Musculoskeletal: no arthralgias and no myalgias.   Integumentary: no rashes, no skin lesions, no itching, no dry skin and  no unusual growth on the skin.   Neurological: no fainting, no headache, no numbness, no convulsions and no weakness.   Psychiatric: no anxiety and no depression.   Endocrine: no increased thirst.   Hematologic/Lymphatic: no swollen glands, no tendency for easy bleeding and no tendency for easy bruising.   All other systems have been reviewed and are negative for complaint.      Medical History     · Abdominal pain, acute, right upper quadrant (789.01,338.19) (R10.11)   · Allergy-induced asthma (493.00) (J45.909)   · Arthropathy (716.90) (M12.9)   · Added by Problem List Migration; 2013-3-25   · Atopic dermatitis (691.8) (L20.9)   · BMI 30.0-30.9,adult (V85.30) (Z68.30)   · Body mass index (BMI) of 29.0 to 29.9 in adult (V85.25) (Z68.29)   · Conjunctivitis (372.30) (H10.9)   · Contact dermatitis (692.9) (L25.9)   · Costochondritis (733.6) (M94.0)   · Dermatitis (692.9) (L30.9)   · Diabetes mellitus (250.00) (E11.9)   · Added by Problem List Migration; 2013-3-25   · Esophageal reflux (530.81) (K21.9)   · Added by Problem List Migration; 2013-3-25   · 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)   · Added by Problem List Migration; 2013-3-25   · Insomnia (780.52) (G47.00)   · Lateral epicondylitis of left elbow (726.32) (M77.12)   · Multiple vitamin deficiency (269.2) (E56.9)   · Added by Problem List Migration; 2013-3-25   · Osteoarthrosis (715.90) (M19.90)   · Added by Problem List Migration; 2013-3-25   · Rib pain (786.50) (R07.81)   · Right ear pain (388.70) (H92.01)   · Right shoulder pain (719.41) (M25.511)   · Sialoadenitis (527.2) (K11.20)   · Simple goiter (240.0) (E04.0)   · Sinusitis (473.9) (J32.9)   · Somatic dysfunction of occipitocervical region (739.0) (M99.00)   · Somatic dysfunction of rib (739.8) (M99.08)   · Somatic dysfunction of right upper extremity (739.7) (M99.07)   · Somatic dysfunction of torso region (739.9) (M99.09)   · Vitamin B12 deficiency (266.2) (E53.8)   · Vitamin D deficiency  (268.9) (E55.9)   · Added by Problem List Migration;      · History of Allergic conjunctivitis of both eyes (372.14) (H10.13)   · History of Enlarged lymph node in neck (785.6) (R59.0)   · Resolved Date: 20 Oct 2020   · History of High cholesterol (272.0) (E78.00)   · History of neck swelling (V13.89) (Z87.898)   · Resolved Date: 20 Oct 2020     Surgical History     · History of Arthroscopy Knee Left   · History of Breast Surgery Reduction Procedure   · History of Gynecologic Services Thermal Endometrial Ablation     Family History     · Family history of Diabetes Mellitus (V18.0)   · Family history of Hypertension (V17.49)   · Family history of Ovarian Cancer (V16.41)     · Denied: No pertinent family history     · Family history of Celiac disease in pediatric patient     Social History     · Alcohol Use (History)   · Does not use illicit drugs (V49.89) (Z78.9)   · Marital History - Single   · Never smoked tobacco (V49.89) (Z78.9)   · Never smoker   · Occasional alcohol use   · Tobacco Non-user   · Unemployed     Allergies     · No Known Drug Allergies   Recorded By: Mojgan Caban; 4/10/2013 8:30:16 AM     Current Meds     Medication Name Instruction   Atorvastatin Calcium 20 MG Oral Tablet TAKE 1 TABLET AT BEDTIME   Cipro HC 0.2-1 % Otic Suspension INSTILL 3 DROPS IN AFFECTED EAR(S) TWICE DAILY.   Invokana 100 MG Oral Tablet Take 1 tablet daily   Lisinopril TABS     Naproxen 500 MG Oral Tablet TAKE 1 TABLET BY MOUTH TWICE DAILY AFTER A MEAL   traZODone HCl - 150 MG Oral Tablet TAKE 1 TABLET AT BEDTIME.   Triamcinolone Acetonide 0.5 % External Cream APPLY SPRAINGLY 2-3 TIMES DAILY TO AFFECTED AREA(S) X 7-10 DAYS; DO NOT USE ON FACE OR GROIN   True Metrix Blood Glucose Test In Vitro Strip USE 1 STRIP Daily   True Metrix Meter w/Device Kit Please check blood sugar once per day.   Vitamin D (Ergocalciferol) 1.25 MG (15221 UT) Oral Capsule TAKE 1 CAPSULE EVERY 2 WEEKS.      Physical Exam  (Old exam)  General  appearance: Healthy-appearing, well-nourished, well groomed, in no acute distress.      Head and Face: Atraumatic with no masses, lesions, or scarring.      Salivary glands: No tenderness of the parotid glands or parotid masses.      No tenderness of the submandibular glands or submandibular masses.      Facial strength: Normal strength and symmetry, no synkinesis or facial tic.      Eyes: Conjunctivas look non-hyperemic bilaterally     Ears: Bilaterally ear canals look normal. Tympanic membranes look intact, no hyperemia, fluid or retraction. Hearing grossly normal.      Nose: Mucosa looks normal. No purulent discharge. Septum essentially straight.      Oral Cavity/Mouth: Lips and tongue look normal.      Throat: No postnasal discharge. No tonsil hypertrophy. No hyperemia.     Neck: Symmetrical, trachea midline. Thyroid looks bulky.     Pulmonary: Normal respiratory effort.      Lymphatic No palpable pathologic lymph nodes at neck.      Neurological/Psychiatric Orientation to person, place, and time: Normal.   Mood and affect: Normal.      Extremities: No clubbing.      Skin: No skin lesions were noted at face or neck        Results/Data  Ultrasound Neck [id number] 10:20AM Kalli Freitas   [Oct 2, 2020 3:26PM Meron Zimmerman]  Notified patient   [Sep 29, 2020 8:38PM Kalli Frietas]  Interval DECREASE in the size of the benign-appearing left neck lymph node, likely reactive.   [Sep 25, 2020 9:15AM Kalli Freitas]  Reason: Unspecified for Ultrasound Neck Mass      Test Name Result Flag Reference   Ultrasound Neck Mass (Report)       Interpreted by: JENIFFER MG  09/28/20 11:36  MRN: [id number]  Patient Name: FRED BROOKS     STUDY:  US NECK MASS 9/28/2020 10:20 am     INDICATION:  48 y/o  F with ENLARGED LYMPH NODES IN NECK AND NECK SWELLING.     COMPARISON:  None.     ACCESSION NUMBER(S):  [id number]     ORDERING CLINICIAN:  KALLI FREITAS     TECHNIQUE:  Routine ultrasound of the thyroid was performed. Static images  were  obtained for remote interpretation.     FINDINGS:  Interval decrease in the size of left level 3 benign-appearing lymph  node, now measures 1.4 x 0.6 x 0.8 cm and previously measured 1.7 x  0.6 x 1.3 cm.     IMPRESSION:  Interval decrease in the size of the benign-appearing left level 3  lymph node, likely reactive.     Electronically signed by: JENIFFER MG  09/28/20 11:36      Complete Blood Count + Differential [id number] 10:04AM Kalli Cadet   [Oct 2, 2020 3:26PM Meron Zimmerman]  Notified patient   [Sep 29, 2020 8:45PM Kalli Cadet]  blood counts are normal, normal liver and kideny and thyroid function. Choelsterol showed improved LDL but worsening of the triglycerides. Vit D and B12 has falled again, please make sure to restart replacement. A1c was 7%, slightly high than last time it was checked at 6.8%.      Test Name Result Flag Reference   White Blood Cell Count 4.4 x10E9/L   4.4 - 11.3   Red Blood Cell Count 4.30 x10E12/L   See Below   Reference Range: 4.00 - 5.20   Hemoglobin 13.0 g/dL   See Below   Reference Range: 12.0 - 16.0   HCT 42.2 %   See Below   Reference Range: 36.0 - 46.0   MCV 98 fL   80 - 100   MCHC 30.8 g/dL L See Below   Reference Range: 32.0 - 36.0   Platelet Count 236 x10E9/L   150 - 450   RDW-CV 13.1 %   See Below   Reference Range: 11.5 - 14.5   Neutrophil % 50.5 %   See Below   Reference Range: 40.0 - 80.0   % Automated Immature Gran 0.0 %   0.0 - 0.9   Immature Granulocyte Count (IG) includes promyelocytes,    myelocytes and metamyelocytes but does not include bands.   Percent differential counts (%) should be interpreted in the   context of the absolute cell counts (cells/L).   Lymphocyte % 36.8 %   See Below   Reference Range: 13.0 - 44.0   Monocyte % 8.3 %   2.0 - 10.0   Eosinophil % 3.7 %   0.0 - 6.0   Basophil % 0.7 %   0.0 - 2.0   Neutrophil Count 2.20 x10E9/L   See Below   Reference Range: 1.20 - 7.70   Lymphocyte Count 1.60 x10E9/L   See Below   Reference Range: 1.20 -  4.80   Monocyte Count 0.36 x10E9/L   See Below   Reference Range: 0.10 - 1.00   Eosinophil Count 0.16 x10E9/L   See Below   Reference Range: 0.00 - 0.70   Basophil Count 0.03 x10E9/L   See Below   Reference Range: 0.00 - 0.10      Ultrasound Neck [id number] 02:10PM Kalli Freitas   [Jul 30, 2020 3:01PM Kalli Freitas]  there is an enlarge lymph node on the neck, we could repeat the US in 2-4 weeks to see if it resolved on its own, or se can refer to Dr Donovan for review of US and a thorough examination of the head and neck.   [Jul 29, 2020 1:48PM Kalli Freitas]  Reason: Unspecified for Ultrasound Neck Mass      Test Name Result Flag Reference   Ultrasound Neck Mass (Report)       Interpreted by: ANATOLIY CONSTANTINO  07/30/20 09:21  MRN: [id number]  Patient Name: FRED BROOKS     STUDY:  US NECK MASS; ; 7/29/2020 2:10 pm     INDICATION:  . rule out salivary gland stone carmella lymphadenopathy..     COMPARISON:  None.     ACCESSION NUMBER(S):  [id number]     ORDERING CLINICIAN:  KALLI FREITAS     TECHNIQUE:  Ultrasound at the site of palpable lump in the left cervical region.     FINDINGS:  Correlating with the area of the palpable lump in left cervical level  III region there is a lymph node measuring 17 x 6 x 13 mm.     IMPRESSION:  Left cervical level III lymph node measuring 6 mm short axis at the  site of the palpable finding, possibly reactive. Suggest continued  clinical follow-up for palpable findings.        Electronically signed by: ANATOLIY CONSTANTINO  07/30/20 09:21         Assessment and Plan:    11.07.2023: Sometimes she has tinnitus in her ears, for the past 2-3 months. More on the right side. No change in hearing. She is annoyed by loud sounds. Dizziness (happened twice, lasted seconds).    No new medicine, she is taking atorvastatin and meloxixam  which may cause tinnitus.   Sometimes ear pain (mild).   No ear fullness.  Ears essentially look normal on exam.    Plan:  1- hearing test, follow up after the  test    _________________________________________________________________

## 2023-11-09 ENCOUNTER — E-VISIT (OUTPATIENT)
Dept: PRIMARY CARE | Facility: CLINIC | Age: 53
End: 2023-11-09
Payer: COMMERCIAL

## 2023-11-09 DIAGNOSIS — M25.569 KNEE PAIN, UNSPECIFIED CHRONICITY, UNSPECIFIED LATERALITY: Primary | ICD-10-CM

## 2023-11-17 ENCOUNTER — APPOINTMENT (OUTPATIENT)
Dept: RADIOLOGY | Facility: HOSPITAL | Age: 53
End: 2023-11-17
Payer: COMMERCIAL

## 2023-11-17 ENCOUNTER — HOSPITAL ENCOUNTER (EMERGENCY)
Facility: HOSPITAL | Age: 53
Discharge: HOME | End: 2023-11-17
Attending: PHYSICIAN ASSISTANT
Payer: COMMERCIAL

## 2023-11-17 VITALS
TEMPERATURE: 97.8 F | OXYGEN SATURATION: 100 % | BODY MASS INDEX: 29.71 KG/M2 | RESPIRATION RATE: 16 BRPM | WEIGHT: 174 LBS | HEIGHT: 64 IN | HEART RATE: 72 BPM | SYSTOLIC BLOOD PRESSURE: 121 MMHG | DIASTOLIC BLOOD PRESSURE: 75 MMHG

## 2023-11-17 DIAGNOSIS — G89.29 CHRONIC PAIN OF BOTH KNEES: ICD-10-CM

## 2023-11-17 DIAGNOSIS — M25.562 CHRONIC PAIN OF BOTH KNEES: ICD-10-CM

## 2023-11-17 DIAGNOSIS — G89.29 CHRONIC PAIN OF BOTH SHOULDERS: Primary | ICD-10-CM

## 2023-11-17 DIAGNOSIS — M25.561 CHRONIC PAIN OF BOTH KNEES: ICD-10-CM

## 2023-11-17 DIAGNOSIS — M25.512 CHRONIC PAIN OF BOTH SHOULDERS: Primary | ICD-10-CM

## 2023-11-17 DIAGNOSIS — M25.511 CHRONIC PAIN OF BOTH SHOULDERS: Primary | ICD-10-CM

## 2023-11-17 PROCEDURE — 93971 EXTREMITY STUDY: CPT | Mod: FOREIGN READ | Performed by: RADIOLOGY

## 2023-11-17 PROCEDURE — 71046 X-RAY EXAM CHEST 2 VIEWS: CPT | Mod: FOREIGN READ | Performed by: RADIOLOGY

## 2023-11-17 PROCEDURE — 99285 EMERGENCY DEPT VISIT HI MDM: CPT | Mod: 25 | Performed by: PHYSICIAN ASSISTANT

## 2023-11-17 PROCEDURE — 99284 EMERGENCY DEPT VISIT MOD MDM: CPT | Mod: 25

## 2023-11-17 PROCEDURE — 73030 X-RAY EXAM OF SHOULDER: CPT | Mod: BILATERAL PROCEDURE | Performed by: RADIOLOGY

## 2023-11-17 PROCEDURE — 73030 X-RAY EXAM OF SHOULDER: CPT | Mod: 50,FR

## 2023-11-17 PROCEDURE — 71046 X-RAY EXAM CHEST 2 VIEWS: CPT

## 2023-11-17 PROCEDURE — 93971 EXTREMITY STUDY: CPT | Mod: FR

## 2023-11-17 ASSESSMENT — PAIN SCALES - GENERAL: PAINLEVEL_OUTOF10: 7

## 2023-11-17 ASSESSMENT — PAIN - FUNCTIONAL ASSESSMENT: PAIN_FUNCTIONAL_ASSESSMENT: 0-10

## 2023-11-17 NOTE — ED PROVIDER NOTES
"HPI   Chief Complaint   Patient presents with    Knee Pain     Bilateral for about 4 months, much worse for 1 month    Shoulder Pain     Bilateral for 4 months.     Back Pain     For 2 days; left flank       52-year-old female with past medical history positive for diabetes and hyperlipidemia and has had chronic multiple joint pains initially started with bilateral knee pain and now with bilateral shoulder pain patient states that she feels like there is \"swelling\" to the left arm at times now   And pain to the left lateral mid upper back with movement or inspiration    She is currently in physical therapy for this she did have x-rays to her knees 3 weeks ago that showed arthritis and small effusions  No testing done to her shoulders    No chest pain no shortness of breath no fevers or chills    She did just start with physical therapy this past week                          No data recorded                Patient History   Past Medical History:   Diagnosis Date    Acute atopic conjunctivitis, bilateral     Allergic conjunctivitis of both eyes    Localized enlarged lymph nodes 10/20/2020    Enlarged lymph node in neck    Personal history of other diseases of the nervous system and sense organs 11/18/2022    History of tension headache    Personal history of other specified conditions 10/20/2020    History of neck swelling    Pure hypercholesterolemia, unspecified     High cholesterol     Past Surgical History:   Procedure Laterality Date    BREAST SURGERY  04/10/2013    Breast Surgery Reduction Procedure    ENDOMETRIAL ABLATION  04/10/2013    Gynecologic Services Thermal Endometrial Ablation    KNEE ARTHROSCOPY W/ DEBRIDEMENT  04/10/2013    Arthroscopy Knee Left     Family History   Problem Relation Name Age of Onset    Diabetes Mother      Hypertension Mother      Ovarian cancer Mother      No Known Problems Father      Celiac disease Son          in pediatric patient     Social History     Tobacco Use    Smoking " status: Never    Smokeless tobacco: Never   Vaping Use    Vaping Use: Not on file   Substance Use Topics    Alcohol use: Not Currently     Comment: occasionally    Drug use: Never       Physical Exam   ED Triage Vitals [11/17/23 1135]   Temp Heart Rate Resp BP   36.6 °C (97.8 °F) 68 17 116/78      SpO2 Temp src Heart Rate Source Patient Position   100 % -- -- --      BP Location FiO2 (%)     -- --       Physical Exam  Vitals and nursing note reviewed.   Constitutional:       General: She is not in acute distress.     Appearance: She is well-developed.   HENT:      Head: Normocephalic and atraumatic.      Right Ear: Tympanic membrane, ear canal and external ear normal.      Left Ear: Tympanic membrane, ear canal and external ear normal.      Mouth/Throat:      Mouth: Mucous membranes are dry.      Pharynx: Oropharynx is clear.   Eyes:      Conjunctiva/sclera: Conjunctivae normal.   Cardiovascular:      Rate and Rhythm: Normal rate and regular rhythm.      Heart sounds: No murmur heard.  Pulmonary:      Effort: Pulmonary effort is normal. No respiratory distress.      Breath sounds: Normal breath sounds.   Abdominal:      Palpations: Abdomen is soft.      Tenderness: There is no abdominal tenderness.   Musculoskeletal:         General: No swelling.      Cervical back: Neck supple.      Comments: Tenderness with palp of rotation bilateral shoulders no obvious deformity on exam    Patient complaints of swelling to her left arm but currently no swelling noted  Pulses 2+ equal bilateral upper extremities full range of motion equal bilateral upper extremities although there is pain with rotation of her shoulders    Pulses 2+ equal bilateral lower extremities slight tenderness and pain with flexion extension of bilateral knees   Skin:     General: Skin is warm and dry.      Capillary Refill: Capillary refill takes less than 2 seconds.   Neurological:      General: No focal deficit present.      Mental Status: She is alert  and oriented to person, place, and time. Mental status is at baseline.   Psychiatric:         Mood and Affect: Mood normal.         ED Course & MDM   Diagnoses as of 11/17/23 1453   Chronic pain of both shoulders   Chronic pain of both knees       Medical Decision Making  Patient is already on Mobic she starts physical therapy this next week for both her shoulders and her knees    Stressed need for close follow-up with her primary doctor for any additional treatment if needed    Amount and/or Complexity of Data Reviewed  Radiology: independent interpretation performed.     Details: I do not see any acute cardiopulmonary process in the chest x-ray, bilateral shoulder x-rays mild degenerative changes otherwise negative    Reviewed the ultrasound report of the left upper extremity        Procedure  Procedures     Christa Lugo PA-C  11/17/23 1202       Christa Lugo PA-C  11/17/23 [id number]

## 2023-11-17 NOTE — ED NOTES
Pt reports chronic bilateral knee and shoulder pain (for 4 months) in addition to left mid back pain for 2 days. Pt currently in physical therapy     Rosalia Pierre RN  11/17/23 [id number]

## 2023-11-17 NOTE — Clinical Note
Elizabeth Fragoso was seen and treated in our emergency department on 11/17/2023.  She may return to work on 11/21/2023.       If you have any questions or concerns, please don't hesitate to call.      Christa Lugo PA-C

## 2023-11-17 NOTE — DISCHARGE INSTRUCTIONS
Keep your appointments with physical therapy let them know you are also having shoulder/upper back pain

## 2023-11-22 ENCOUNTER — EVALUATION (OUTPATIENT)
Dept: PHYSICAL THERAPY | Facility: HOSPITAL | Age: 53
End: 2023-11-22
Payer: COMMERCIAL

## 2023-11-22 DIAGNOSIS — M25.569 KNEE PAIN, UNSPECIFIED CHRONICITY, UNSPECIFIED LATERALITY: Primary | ICD-10-CM

## 2023-11-22 PROCEDURE — 97161 PT EVAL LOW COMPLEX 20 MIN: CPT | Mod: GP | Performed by: PHYSICAL THERAPIST

## 2023-11-22 PROCEDURE — 97110 THERAPEUTIC EXERCISES: CPT | Mod: GP | Performed by: PHYSICAL THERAPIST

## 2023-11-22 ASSESSMENT — PAIN SCALES - GENERAL: PAINLEVEL_OUTOF10: 8

## 2023-11-22 ASSESSMENT — ENCOUNTER SYMPTOMS
OCCASIONAL FEELINGS OF UNSTEADINESS: 1
DEPRESSION: 1
LOSS OF SENSATION IN FEET: 0

## 2023-11-22 ASSESSMENT — PAIN DESCRIPTION - DESCRIPTORS: DESCRIPTORS: PATIENT UNABLE TO DESCRIBE

## 2023-11-22 ASSESSMENT — ACTIVITIES OF DAILY LIVING (ADL): ADL_ASSISTANCE: INDEPENDENT

## 2023-11-22 ASSESSMENT — PAIN - FUNCTIONAL ASSESSMENT: PAIN_FUNCTIONAL_ASSESSMENT: 0-10

## 2023-11-22 NOTE — PROGRESS NOTES
Physical Therapy    Physical Therapy Evaluation and Treatment      Patient Name: Elizabeth Fragoso  MRN: [id number]  Today's Date: 11/22/2023  Time Calculation  Start Time: 0832  Stop Time: 0915  Time Calculation (min): 43 min    Assessment:  PT Assessment  PT Assessment Results: Decreased strength, Decreased range of motion, Decreased mobility, Pain  Rehab Prognosis: Good  Evaluation/Treatment Tolerance: Patient tolerated treatment well   Patient demonstrated impaired strength in bilateral lower extremities, impaired knee and hip range of motion. Patient demonstrated difficulty standing up out of a chair and performing sit<->supine transfers due to pain. Skilled physical therapy is warranted to address the above stated impairments, so the patient can perform functional activities and work duties without increased pain or difficulty.    Plan:  OP PT Plan  Treatment/Interventions: Education/ Instruction, Gait training, Manual therapy, Neuromuscular re-education, Therapeutic activities, Therapeutic exercises  PT Plan: Skilled PT  PT Frequency: 2 times per week  Duration: 8 weeks  Onset Date: 11/10/23  Certification Period Start Date: 11/22/23  Certification Period End Date: 01/17/24  Number of Treatments Authorized: 30 (1 of 8)  Rehab Potential: Good  Plan of Care Agreement: Patient    Current Problem:   1. Knee pain, unspecified chronicity, unspecified laterality  Referral to Physical Therapy    Follow Up In Physical Therapy          Subjective    General:  General  Reason for Referral: bilateral knee pain  Referred By: Dr. Pellteier  Patient is a 52 year old female presenting with bilateral knee pain. Patient also has complaints of low back/hip and bilateral shoulder pain. Patient states that she started having knee pain about 4 months ago. Patient states that she woke up with her knees hurting.    Precautions:  Precautions  STEADI Fall Risk Score (The score of 4 or more indicates an increased risk of falling):  8  Medical Precautions: Fall precautions    Pain:  Pain Assessment  Pain Assessment: 0-10  Pain Score: 8  Pain Type: Acute pain  Pain Location: Knee  Pain Orientation: Right, Left  Pain Descriptors: Patient unable to describe  Pain Frequency: Constant/continuous  Patient's Stated Pain Goal: No pain  Pain Interventions: Medication (See MAR)  Home Living:   Patient lives with her son in a 2 story house. Patient's bedroom is on the 2nd floor.  Prior Level of Function:  Prior Function Per Pt/Caregiver Report  Level of Webb: Independent with ADLs and functional transfers, Independent with homemaking with ambulation  ADL Assistance: Independent  Homemaking Assistance: Independent  Ambulatory Assistance: Independent  Vocational: On disability  Hand Dominance: RightPatient states that she takes the stairs 1 at a time and uses a handrail for support.    Objective     Functional Assessments:  Gait Comment: slow antalgic gait with reciprocal pattern. decreased step/stride length    HIP    Hip Palpation/Joint Mobility Assessment  Palpation / Joint Mobility Comment: bilateral patellar lateral tilt    Hip PROM WFL unless documented below  R hip flexion: (125°): 108  L hip flexion: (125°): 102  Specific Lower Extremity MMT WFL unless documented below  R Iliopsoas: (5/5): 3/5  L Iliopsoas: (5/5): 3+/5  R Gluteals (prone): (5/5): 3-/5  L Gluteals (prone): (5/5): 3/5  R Gluteals (sidelying): (5/5): 4/5  L Gluteals (sidelying): (5/5): 3+/5    Knee Functional Rating Scale  LEFS /80: 19/80    Knee Observation  Observation Comment: bilateral patella shirin    Knee PROM WFL unless documented below  R knee flexion: (140°): 124  L knee flexion: (140°): 118  R knee extension: (0°): 0  L knee extension: (0°): 0  Knee MMT WFL unless documented below  R knee flexion: (5/5): 4-/5  L knee flexion: (5/5): 3+/5  R knee extension: (5/5): 4-/5  L knee extension: (5/5): 3/5    Special Tests Negative unless documented below  Lachman’s:  "(Negative): - BLE  Anterior Drawer: (Negative): -BLE  Posterior Drawer: (Negative): -BLE  Varus at 0°: (Negative): -BLE  Varus at 30°: (Negative): -BLE  Valgus at 0°: (Negative): -BLE  Valgus at 30°: (Negative): -BLE  Dionisio’s: (Negative): +left, -right     Flexibility  R hamstrings: -28  L hamstrings: -32  Ankle AROM WFL unless documented below  R ankle dorsiflexion: (10°): 12  L ankle dorsiflexion: (10°): 10  Ankle PROM WFL unless documented below     Ankle MMT WFL unless documented below  R ankle dorsiflexion: (5/5): 4/5  L ankle dorsiflexion: (5/5): 4/5    Treatments:  Therapeutic Exercise  Therapeutic Exercise Performed: Yes  Therapeutic Exercise Activity 1: QS x10 BLE  Therapeutic Exercise Activity 2: SLR x5 BLE  Therapeutic Exercise Activity 3: supine hip abd x5 BLE  Therapeutic Exercise Activity 4: LAQ x5 BLE  Therapeutic Exercise Activity 5: hamstring stretch 3x20\" BLE    EDUCATION:  Outpatient Education  Individual(s) Educated: Patient  Education Provided: Home Exercise Program, POC  Patient/Caregiver Demonstrated Understanding: yes  Plan of Care Discussed and Agreed Upon: yes  Patient Response to Education: Patient/Caregiver Verbalized Understanding of Information    Goals:            "

## 2023-11-28 ENCOUNTER — OFFICE VISIT (OUTPATIENT)
Dept: ORTHOPEDIC SURGERY | Facility: CLINIC | Age: 53
End: 2023-11-28
Payer: COMMERCIAL

## 2023-11-28 DIAGNOSIS — M25.50 POLYARTHRALGIA: Primary | ICD-10-CM

## 2023-11-28 PROCEDURE — 99213 OFFICE O/P EST LOW 20 MIN: CPT | Performed by: ORTHOPAEDIC SURGERY

## 2023-11-28 PROCEDURE — 3008F BODY MASS INDEX DOCD: CPT | Performed by: ORTHOPAEDIC SURGERY

## 2023-11-28 PROCEDURE — 1036F TOBACCO NON-USER: CPT | Performed by: ORTHOPAEDIC SURGERY

## 2023-11-28 PROCEDURE — 3051F HG A1C>EQUAL 7.0%<8.0%: CPT | Performed by: ORTHOPAEDIC SURGERY

## 2023-11-28 RX ORDER — METHYLPREDNISOLONE 4 MG/1
TABLET ORAL
Qty: 21 TABLET | Refills: 0 | Status: SHIPPED | OUTPATIENT
Start: 2023-11-28 | End: 2024-01-29

## 2023-11-28 NOTE — PROGRESS NOTES
Subjective    Patient ID: Elizabeth Fragoso is a 52 y.o. female.    Chief Complaint: Follow-up of the Right Knee and Follow-up of the Left Knee     Last Surgery: No surgery found  Last Surgery Date: No surgery found    HPI she comes in today her knees but she also reports that her hips hurt her shoulders hurt she has been doing some physical therapy for the knees    Objective   Ortho Exam range of motion 0 115 with excellent stability some patellofemoral crepitance seems better today no effusion no distal edema her hips hurt laterally around the trochanters shoulders just hurt in general with motion of the joint and subacromially    Image Results:  XR shoulder 2+ views bilateral  Narrative: STUDY:  Bilateral Shoulder Radiographs; 11/17/2023 at 12:25 PM.  INDICATION:  Pain.  COMPARISON:  None available.  ACCESSION NUMBER(S):  [id number]  ORDERING CLINICIAN:  AJ CARTER  TECHNIQUE:  Three view(s) of the right shoulder and three view(s) of  the left shoulder.  FINDINGS:    RIGHT SHOULDER:  There is no displaced fracture.  The alignment is  anatomic.  No soft tissue abnormality is seen.  LEFT SHOULDER:  There is no displaced fracture.  The alignment is  anatomic.  No soft tissue abnormality is seen.  Impression: No acute bony abnormalities are identified in either shoulder..  Signed by Nitesh Horton MD  XR chest 2 views  Narrative: STUDY:  Chest Radiographs;  11/17/2023, 12:55PM.  INDICATION:  Pain.  COMPARISON:  None.  ACCESSION NUMBER(S):  [id number]  ORDERING CLINICIAN:  AJ CARTER  TECHNIQUE:  Frontal and lateral chest.   FINDINGS:  CARDIOMEDIASTINAL SILHOUETTE:  Cardiomediastinal silhouette is normal in size and configuration.     LUNGS:  Lungs are clear.     ABDOMEN:  No remarkable upper abdominal findings.     BONES:  No acute osseous changes.  Impression: No radiographic evidence of acute cardiopulmonary disease.  Signed by Nelson Jose MD  Vascular US upper extremity venous duplex left  Narrative:  STUDY:  Left Upper Extremity Venous Doppler Ultrasound; 12:35 PM  INDICATION:  Left upper extremity pain and edema.  COMPARISON:  None available.  ACCESSION NUMBER(S):  [id number]  ORDERING CLINICIAN:  AJ CARTER  TECHNIQUE:    Real-time grayscale, color, and spectral doppler ultrasound imaging of  the left upper extremity veins was performed.  FINDINGS:   The left internal jugular, subclavian, axillary and brachial veins  demonstrated normal compressibility, normal phasic venous flow, and  normal response to augmentation.  There is no evidence for echogenic  thrombi.  The cephalic and basilic veins are patent.     The contralateral subclavian vein is free of thrombosis.  Impression: No evidence for DVT within the left upper extremity.  Signed by Nelson Jose MD      Assessment/Plan this point her x-ray studies appear normal she is doing therapy so to do is only try a steroid pack for 6 days if this helps terrific if it flares up again or does not help then regular rheumatology  Encounter Diagnoses:  Polyarthralgia    No orders of the defined types were placed in this encounter.    No follow-ups on file.

## 2023-11-30 ENCOUNTER — APPOINTMENT (OUTPATIENT)
Dept: PHYSICAL THERAPY | Facility: HOSPITAL | Age: 53
End: 2023-11-30
Payer: COMMERCIAL

## 2023-12-01 ENCOUNTER — APPOINTMENT (OUTPATIENT)
Dept: PHYSICAL THERAPY | Facility: HOSPITAL | Age: 53
End: 2023-12-01
Payer: COMMERCIAL

## 2023-12-04 ENCOUNTER — APPOINTMENT (OUTPATIENT)
Dept: AUDIOLOGY | Facility: CLINIC | Age: 53
End: 2023-12-04
Payer: COMMERCIAL

## 2023-12-04 ENCOUNTER — APPOINTMENT (OUTPATIENT)
Dept: OTOLARYNGOLOGY | Facility: CLINIC | Age: 53
End: 2023-12-04
Payer: COMMERCIAL

## 2023-12-07 ENCOUNTER — APPOINTMENT (OUTPATIENT)
Dept: PHYSICAL THERAPY | Facility: HOSPITAL | Age: 53
End: 2023-12-07
Payer: COMMERCIAL

## 2023-12-08 ENCOUNTER — APPOINTMENT (OUTPATIENT)
Dept: PHYSICAL THERAPY | Facility: HOSPITAL | Age: 53
End: 2023-12-08
Payer: COMMERCIAL

## 2024-01-12 ENCOUNTER — DOCUMENTATION (OUTPATIENT)
Dept: PHYSICAL THERAPY | Facility: HOSPITAL | Age: 54
End: 2024-01-12
Payer: COMMERCIAL

## 2024-01-12 DIAGNOSIS — M25.569 KNEE PAIN, UNSPECIFIED CHRONICITY, UNSPECIFIED LATERALITY: Primary | ICD-10-CM

## 2024-01-12 NOTE — PROGRESS NOTES
Physical Therapy    Discharge Summary    Name: Elizabeth Fragoso  MRN: [id number]  : 1970  Date: 24    Discharge Summary: PT    Discharge Information: Date of discharge 2024, Date of last visit 2023, Date of evaluation 2023, Number of attended visits 1, Referred by Dr. Pelletier, and Referred for bilateral knee pain    Discharge Status: only attended the evaluation     Rehab Discharge Reason: Failed to schedule and/or keep follow-up appointment(s)

## 2024-01-22 ENCOUNTER — TELEPHONE (OUTPATIENT)
Dept: PRIMARY CARE | Facility: CLINIC | Age: 54
End: 2024-01-22
Payer: COMMERCIAL

## 2024-01-22 NOTE — TELEPHONE ENCOUNTER
Pt's dtr called in to report that pt had eaten something last night and had an allergic reaction she took OTC benadryl and then woke up this morning with rash being back. Pt's dtr wanted to be advised to either go to er or take the atarax. Pt's dtr was advised to have mom checked out at ED.

## 2024-01-24 PROBLEM — R10.11 ABDOMINAL PAIN, ACUTE, RIGHT UPPER QUADRANT: Status: ACTIVE | Noted: 2024-01-24

## 2024-01-24 PROBLEM — B34.9 VIRAL ILLNESS: Status: ACTIVE | Noted: 2024-01-24

## 2024-01-24 PROBLEM — M22.42 CHONDROMALACIA OF PATELLA, LEFT: Status: ACTIVE | Noted: 2024-01-24

## 2024-01-29 ENCOUNTER — OFFICE VISIT (OUTPATIENT)
Dept: PRIMARY CARE | Facility: CLINIC | Age: 54
End: 2024-01-29
Payer: COMMERCIAL

## 2024-01-29 VITALS
DIASTOLIC BLOOD PRESSURE: 80 MMHG | WEIGHT: 176 LBS | HEIGHT: 64 IN | HEART RATE: 83 BPM | OXYGEN SATURATION: 98 % | BODY MASS INDEX: 30.05 KG/M2 | SYSTOLIC BLOOD PRESSURE: 113 MMHG

## 2024-01-29 DIAGNOSIS — E08.00 DIABETES MELLITUS DUE TO UNDERLYING CONDITION WITH HYPEROSMOLARITY WITHOUT COMA, UNSPECIFIED WHETHER LONG TERM INSULIN USE (MULTI): ICD-10-CM

## 2024-01-29 DIAGNOSIS — E78.5 DYSLIPIDEMIA: ICD-10-CM

## 2024-01-29 DIAGNOSIS — E55.9 VITAMIN D DEFICIENCY: ICD-10-CM

## 2024-01-29 DIAGNOSIS — M35.3 POLYMYALGIA RHEUMATICA (MULTI): Primary | ICD-10-CM

## 2024-01-29 LAB — POC HEMOGLOBIN A1C: 7.9 % (ref 4.2–6.5)

## 2024-01-29 PROCEDURE — 3074F SYST BP LT 130 MM HG: CPT | Performed by: INTERNAL MEDICINE

## 2024-01-29 PROCEDURE — 3079F DIAST BP 80-89 MM HG: CPT | Performed by: INTERNAL MEDICINE

## 2024-01-29 PROCEDURE — 83036 HEMOGLOBIN GLYCOSYLATED A1C: CPT | Performed by: INTERNAL MEDICINE

## 2024-01-29 PROCEDURE — 99214 OFFICE O/P EST MOD 30 MIN: CPT | Performed by: INTERNAL MEDICINE

## 2024-01-29 PROCEDURE — 1036F TOBACCO NON-USER: CPT | Performed by: INTERNAL MEDICINE

## 2024-01-29 PROCEDURE — 3008F BODY MASS INDEX DOCD: CPT | Performed by: INTERNAL MEDICINE

## 2024-01-29 RX ORDER — CETIRIZINE HYDROCHLORIDE 10 MG/1
10 TABLET ORAL DAILY
COMMUNITY
Start: 2024-01-22

## 2024-01-29 RX ORDER — CHOLECALCIFEROL (VITAMIN D3) 50 MCG
50 TABLET ORAL DAILY
Qty: 90 TABLET | Refills: 1 | Status: SHIPPED | OUTPATIENT
Start: 2024-01-29 | End: 2024-04-26

## 2024-01-29 RX ORDER — CLOBETASOL PROPIONATE 0.5 MG/G
CREAM TOPICAL
COMMUNITY
Start: 2023-11-28

## 2024-01-29 RX ORDER — CHOLECALCIFEROL (VITAMIN D3) 50 MCG
50 TABLET ORAL DAILY
COMMUNITY
End: 2024-01-29 | Stop reason: SDUPTHER

## 2024-01-29 RX ORDER — PREDNISONE 1 MG/1
1 TABLET ORAL DAILY
Qty: 140 TABLET | Refills: 0 | Status: SHIPPED | OUTPATIENT
Start: 2024-01-29 | End: 2024-06-17

## 2024-01-29 ASSESSMENT — ENCOUNTER SYMPTOMS
MUSCULOSKELETAL NEGATIVE: 1
CONSTITUTIONAL NEGATIVE: 1
RESPIRATORY NEGATIVE: 1
CARDIOVASCULAR NEGATIVE: 1
NEUROLOGICAL NEGATIVE: 1
PSYCHIATRIC NEGATIVE: 1
GASTROINTESTINAL NEGATIVE: 1

## 2024-01-29 ASSESSMENT — PAIN SCALES - GENERAL: PAINLEVEL: 0-NO PAIN

## 2024-01-29 NOTE — PATIENT INSTRUCTIONS
It was great to see you in the office today! Here is what we discussed at your visit today:   Please continue to take your current medications   As discussed, decrease your Prednisone by 1mg every two weeks as follows:   Take your 5mg daily until it is gone, then  Take 4mg daily for two weeks(total of 14 days), then   Take 3mg daily for two weeks(total of 14 days), then  Take 2mg daily for two weeks(total of 14 days), then  Take 1mg daily for two weeks(total of 14 days), then stop  Call if you start having any pain while weaning off Prednisone  We are adding a medication called jardiance to help lower your sugars  Make sure you eat a snack at night before bed  Use hydrating lotion around your eyes, free of perfumes and dyes  Follow up in three months

## 2024-01-29 NOTE — PROGRESS NOTES
"Patient ID: She states that she saw rheumatology and was started on Plequenil for polymyalgia rheumatica but it gave her a rash. She states that she is on low dose of Prednisone for now per Dr. Mann. She states she has no pain on current dose of Prednisone. She states that she is going to see him today.     GIOVANNI Fragoso is a 53 y.o. female with PMH remarkable for Type 2 DM, dyslipidemia, vitamin D deficiency who presents to the office today for three month follow up.    HEALTH MAINTENANCE: FOLLOW UP  Smoking: never smoker  Mammogram (40-75): 5/24/23 WNL  Pap smear (21-65):   Labs: 9/13/23  Colonoscopy (45-75): 7/27/21  Lung cancer screening (55-80 + 30 pack year + smoking/quit in last 15 years): n/a  DEXA (65+, q 2 years): n/a    SOCIAL HISTORY:  Social History     Tobacco Use    Smoking status: Never    Smokeless tobacco: Never   Substance Use Topics    Alcohol use: Not Currently     Comment: occasionally    Drug use: Never     IMMUNIZATIONS:  Immunization History   Administered Date(s) Administered    Pfizer Purple Cap SARS-CoV-2 09/30/2021, 10/21/2021     REVIEW OF SYSTEMS:  Review of Systems   Constitutional: Negative.    Respiratory: Negative.     Cardiovascular: Negative.    Gastrointestinal: Negative.    Genitourinary: Negative.    Musculoskeletal: Negative.    Neurological: Negative.    Psychiatric/Behavioral: Negative.       ALLERGIES:  No Known Allergies     VITAL SIGNS:  Vitals:    01/29/24 1259   BP: 113/80   BP Location: Left arm   Pulse: 83   SpO2: 98%   Weight: 79.8 kg (176 lb)   Height: 1.626 m (5' 4\")     Physical Exam  Vitals reviewed.   Constitutional:       Appearance: Normal appearance.   HENT:      Head: Normocephalic and atraumatic.   Eyes:      Comments: There was slight dryness to skin around bilateral eyes, with some signs of excoriation    Cardiovascular:      Rate and Rhythm: Normal rate and regular rhythm.      Pulses: Normal pulses.      Heart sounds: Normal heart sounds. "   Pulmonary:      Effort: Pulmonary effort is normal.      Breath sounds: Normal breath sounds.   Abdominal:      General: Bowel sounds are normal.      Palpations: Abdomen is soft.   Musculoskeletal:         General: Normal range of motion.   Neurological:      General: No focal deficit present.      Mental Status: She is alert and oriented to person, place, and time.   Psychiatric:         Mood and Affect: Mood normal.         Behavior: Behavior normal.       MEDICATIONS:  Current Outpatient Medications on File Prior to Visit   Medication Sig Dispense Refill    ascorbic acid (Vitamin C) 500 mg ER capsule Take 1 capsule (500 mg) by mouth once daily.      atorvastatin (Lipitor) 20 mg tablet Take 1 tablet (20 mg) by mouth once daily at bedtime. 90 tablet 0    blood sugar diagnostic (True Metrix Glucose Test Strip) strip USE 1 STRIP Daily      canagliflozin (Invokana) 300 mg Take 1 tablet (300 mg) by mouth once daily. 90 tablet 0    FreeStyle Bubba sensor system (FreeStyle Bubba 2 Sensor) kit Use as instructed 2 each 3    methylPREDNISolone (Medrol Dospak) 4 mg tablets Use as directed by package instructions 21 tablet 0     No current facility-administered medications on file prior to visit.      LABORATORY DATA:  Lab Results   Component Value Date    WBC 8.2 09/13/2023    HGB 12.8 09/13/2023    HCT 42.0 09/13/2023     09/13/2023    CHOL 186 09/13/2023    TRIG 83 09/13/2023    HDL 73.8 09/13/2023    ALT 22 09/13/2023    AST 19 09/13/2023     09/13/2023    K 4.4 09/13/2023     09/13/2023    CREATININE 0.75 09/13/2023    BUN 18 09/13/2023    CO2 29 09/13/2023    TSH 2.82 09/13/2023    HGBA1C 7.8 (A) 09/13/2023     ASSESSMENT AND PLAN:  Assessment/Plan   Elizabeth Fragoso is a 52 y.o. female with PMH remarkable for Type 2 DM, dyslipidemia, vitamin D deficiency here for three month follow up  Elizabeth was seen today for diabetes and allergic reaction.  Diagnoses and all orders for this visit: follow  up  - She has complaint of dryness around her eyes, advised to use moisturizing lotion with no perfumes or dyes  - Follow up in three months    Polyarthralgia       -      she had complaint of significant pain over sacroiliac joint, bilateral knees and hips on previous exam, denied any falls or trauma       -      she was started patient on Meloxicam to help with pain short term       -      we checked IFRAH, CRP, ESR, Rheumatoid factor all WNL other than CRP 6.09  -     she was seen by Dr. Mann(rheumatology) and states he is treating her for polymyalgia rheumatica, she could not tolerate Hydroxychloroquine, and is now on Prednisone 5mg daily, states her pain is completely resolved  -     we will slowly wean off steroids, as follows: Prednisone 1mg tablets: Take 4mg daily for two weeks(total of 14 days), then   Take 3mg daily for two weeks(total of 14 days), then Take 2mg daily for two weeks(total of 14 days), then Take 1mg daily for two weeks(total of 14 days), then stop  -    advised patient to call office if she has any pain while weaning off steroids    Diabetes mellitus due to underlying condition with hyperosmolarity without coma, unspecified whether long term insulin use (CMS/[facility])  -     POCT glycosylated hemoglobin (Hb A1C) manually resulted with result of 7.9, was 7.8 previously  -     she states she is unable to tolerate Metformin  -     continue with Invokana  -     will add SITagliptin phosphate (Januvia) 50 mg tablet; Take 1 tablet (50 mg) by mouth once daily for 30 doses.  -     she states that if she does not eat an hs snack, her sugar drops to 60 sometimes around 1am, advised to make sure she eats hs snack every night  -     continue blood sugar monitoring    Vitamin D deficiency  -     cholecalciferol (Vitamin D3) 50 MCG (2000 UT) tablet; Take 1 tablet (50 mcg) by mouth once daily.    Dyslipidemia  - most recent lipid panel on 09/13/23 was normal  - continue with atorvastatin      --------------------  Written by Kristi Galindo LPN, acting as a scribe for Dr. Noel. This note accurately reflects the work and decisions made by Dr. Noel.     I, Dr. Noel, attest all medical record entries made by the scribe were under my direction and were personally dictated by me. I have reviewed the chart and agree that the record accurately reflects my performance of the history, physical exam, and assessment and plan.

## 2024-02-28 DIAGNOSIS — E78.5 DYSLIPIDEMIA: ICD-10-CM

## 2024-02-28 DIAGNOSIS — E08.00 DIABETES MELLITUS DUE TO UNDERLYING CONDITION WITH HYPEROSMOLARITY WITHOUT COMA, UNSPECIFIED WHETHER LONG TERM INSULIN USE (MULTI): ICD-10-CM

## 2024-02-28 RX ORDER — ATORVASTATIN CALCIUM 20 MG/1
20 TABLET, FILM COATED ORAL NIGHTLY
Qty: 90 TABLET | Refills: 0 | Status: SHIPPED | OUTPATIENT
Start: 2024-02-28

## 2024-03-13 DIAGNOSIS — M12.9 ARTHROPATHY: Primary | ICD-10-CM

## 2024-04-25 DIAGNOSIS — E55.9 VITAMIN D DEFICIENCY: ICD-10-CM

## 2024-04-26 RX ORDER — OMEGA-3S/DHA/EPA/FISH OIL/D3 300MG-1000
50 CAPSULE ORAL DAILY
Qty: 90 TABLET | Refills: 1 | Status: SHIPPED | OUTPATIENT
Start: 2024-04-26

## 2024-04-29 ENCOUNTER — APPOINTMENT (OUTPATIENT)
Dept: PRIMARY CARE | Facility: CLINIC | Age: 54
End: 2024-04-29
Payer: COMMERCIAL

## 2024-06-29 DIAGNOSIS — E11.9 TYPE 2 DIABETES MELLITUS WITHOUT COMPLICATION, WITHOUT LONG-TERM CURRENT USE OF INSULIN (MULTI): ICD-10-CM

## 2024-07-01 RX ORDER — FLASH GLUCOSE SENSOR
KIT MISCELLANEOUS
Qty: 6 EACH | Refills: 3 | Status: SHIPPED | OUTPATIENT
Start: 2024-07-01

## 2024-07-23 DIAGNOSIS — E78.5 DYSLIPIDEMIA: ICD-10-CM

## 2024-07-23 RX ORDER — ATORVASTATIN CALCIUM 20 MG/1
20 TABLET, FILM COATED ORAL NIGHTLY
Qty: 90 TABLET | Refills: 0 | Status: SHIPPED | OUTPATIENT
Start: 2024-07-23

## 2024-07-29 ENCOUNTER — APPOINTMENT (OUTPATIENT)
Dept: PRIMARY CARE | Facility: CLINIC | Age: 54
End: 2024-07-29
Payer: COMMERCIAL

## 2024-07-29 VITALS
DIASTOLIC BLOOD PRESSURE: 84 MMHG | WEIGHT: 181.2 LBS | BODY MASS INDEX: 30.93 KG/M2 | HEART RATE: 76 BPM | HEIGHT: 64 IN | OXYGEN SATURATION: 98 % | SYSTOLIC BLOOD PRESSURE: 129 MMHG | RESPIRATION RATE: 16 BRPM

## 2024-07-29 DIAGNOSIS — E78.5 DYSLIPIDEMIA: ICD-10-CM

## 2024-07-29 DIAGNOSIS — E66.9 OBESITY WITHOUT SERIOUS COMORBIDITY, UNSPECIFIED CLASSIFICATION, UNSPECIFIED OBESITY TYPE: ICD-10-CM

## 2024-07-29 DIAGNOSIS — R63.4 WEIGHT LOSS: ICD-10-CM

## 2024-07-29 DIAGNOSIS — E08.00 DIABETES MELLITUS DUE TO UNDERLYING CONDITION WITH HYPEROSMOLARITY WITHOUT COMA, UNSPECIFIED WHETHER LONG TERM INSULIN USE (MULTI): ICD-10-CM

## 2024-07-29 DIAGNOSIS — J45.909 EXTRINSIC ASTHMA WITHOUT COMPLICATION, UNSPECIFIED ASTHMA SEVERITY, UNSPECIFIED WHETHER PERSISTENT (HHS-HCC): ICD-10-CM

## 2024-07-29 LAB — POC HEMOGLOBIN A1C: 7.8 % (ref 4.2–6.5)

## 2024-07-29 PROCEDURE — 83036 HEMOGLOBIN GLYCOSYLATED A1C: CPT | Performed by: INTERNAL MEDICINE

## 2024-07-29 PROCEDURE — 3008F BODY MASS INDEX DOCD: CPT | Performed by: INTERNAL MEDICINE

## 2024-07-29 PROCEDURE — 3074F SYST BP LT 130 MM HG: CPT | Performed by: INTERNAL MEDICINE

## 2024-07-29 PROCEDURE — 3079F DIAST BP 80-89 MM HG: CPT | Performed by: INTERNAL MEDICINE

## 2024-07-29 PROCEDURE — 1036F TOBACCO NON-USER: CPT | Performed by: INTERNAL MEDICINE

## 2024-07-29 PROCEDURE — 99213 OFFICE O/P EST LOW 20 MIN: CPT | Performed by: INTERNAL MEDICINE

## 2024-07-29 RX ORDER — EMPAGLIFLOZIN 25 MG/1
25 TABLET, FILM COATED ORAL DAILY
COMMUNITY
Start: 2024-07-16 | End: 2024-07-29 | Stop reason: SDUPTHER

## 2024-07-29 RX ORDER — BLOOD-GLUCOSE SENSOR
EACH MISCELLANEOUS
COMMUNITY
Start: 2024-07-16

## 2024-07-29 RX ORDER — EMPAGLIFLOZIN 25 MG/1
25 TABLET, FILM COATED ORAL DAILY
Qty: 90 TABLET | Refills: 0 | Status: SHIPPED | OUTPATIENT
Start: 2024-07-29

## 2024-07-29 RX ORDER — ATORVASTATIN CALCIUM 20 MG/1
20 TABLET, FILM COATED ORAL NIGHTLY
Qty: 90 TABLET | Refills: 0 | Status: SHIPPED | OUTPATIENT
Start: 2024-07-29

## 2024-07-29 RX ORDER — TOPIRAMATE 25 MG/1
25 TABLET ORAL DAILY
Qty: 30 TABLET | Refills: 0 | Status: SHIPPED | OUTPATIENT
Start: 2024-07-29 | End: 2024-08-28

## 2024-07-29 ASSESSMENT — PATIENT HEALTH QUESTIONNAIRE - PHQ9
SUM OF ALL RESPONSES TO PHQ9 QUESTIONS 1 AND 2: 0
2. FEELING DOWN, DEPRESSED OR HOPELESS: NOT AT ALL
1. LITTLE INTEREST OR PLEASURE IN DOING THINGS: NOT AT ALL

## 2024-07-29 ASSESSMENT — ENCOUNTER SYMPTOMS
CONSTITUTIONAL NEGATIVE: 1
GASTROINTESTINAL NEGATIVE: 1
RESPIRATORY NEGATIVE: 1
PSYCHIATRIC NEGATIVE: 1
MUSCULOSKELETAL NEGATIVE: 1
CARDIOVASCULAR NEGATIVE: 1
NEUROLOGICAL NEGATIVE: 1

## 2024-07-29 ASSESSMENT — COLUMBIA-SUICIDE SEVERITY RATING SCALE - C-SSRS
2. HAVE YOU ACTUALLY HAD ANY THOUGHTS OF KILLING YOURSELF?: NO
1. IN THE PAST MONTH, HAVE YOU WISHED YOU WERE DEAD OR WISHED YOU COULD GO TO SLEEP AND NOT WAKE UP?: NO
6. HAVE YOU EVER DONE ANYTHING, STARTED TO DO ANYTHING, OR PREPARED TO DO ANYTHING TO END YOUR LIFE?: NO

## 2024-07-29 ASSESSMENT — PAIN SCALES - GENERAL: PAINLEVEL: 0-NO PAIN

## 2024-07-29 NOTE — PROGRESS NOTES
"Patient ID: She states that she is tapering down on Prednisone, pain is well controlled on current dose, 4mg daily, followed by rheumatology. She states she works a lot, does all of her housework, stays active.     HPI Elizabeth Fragoso is a 53 y.o. female with PMH remarkable for Type 2 DM, dyslipidemia, vitamin D deficiency  who presents to the office today for Diabetes.     HEALTH MAINTENANCE: FOLLOW UP   Last Office Visit:1/29/24  Mammogram (40-75):   Pap smear (21-65, or hysterectomy q5yrs): per ob/gyn  Last Labs: 4/10/24  Colonoscopy (45-75 or age 40 with 1st degree relative dx colon ca):  Lung cancer screening (50-78 y/o x 20 pk yr for at least 20 yrs + current smoker OR quit in last 15 years, no CT w/I last year):   DEXA (65+, q 2 years):     Social History     Tobacco Use    Smoking status: Never    Smokeless tobacco: Never   Substance Use Topics    Alcohol use: Not Currently     Comment: occasionally    Drug use: Never     Review of Systems   Constitutional: Negative.    HENT: Negative.     Respiratory: Negative.     Cardiovascular: Negative.    Gastrointestinal: Negative.    Genitourinary: Negative.    Musculoskeletal: Negative.    Skin: Negative.    Neurological: Negative.    Psychiatric/Behavioral: Negative.       Visit Vitals  /84 (BP Location: Right arm)   Pulse 76   Resp 16   Ht 1.626 m (5' 4\")   Wt 82.2 kg (181 lb 3.2 oz)   SpO2 98%   BMI 31.10 kg/m²   OB Status Postmenopausal   Smoking Status Never   BSA 1.93 m²     Physical Exam  Vitals reviewed.   Constitutional:       Appearance: Normal appearance.   HENT:      Head: Normocephalic and atraumatic.   Cardiovascular:      Rate and Rhythm: Normal rate and regular rhythm.      Pulses: Normal pulses.      Heart sounds: Normal heart sounds.   Pulmonary:      Effort: Pulmonary effort is normal.      Breath sounds: Normal breath sounds.   Abdominal:      General: Bowel sounds are normal.      Palpations: Abdomen is soft.   Musculoskeletal:         " General: Normal range of motion.   Skin:     General: Skin is warm and dry.   Neurological:      General: No focal deficit present.      Mental Status: She is alert and oriented to person, place, and time.   Psychiatric:         Mood and Affect: Mood normal.         Behavior: Behavior normal.     Current Outpatient Medications   Medication Instructions    ascorbic acid (VITAMIN C) 500 mg, oral, Daily    atorvastatin (LIPITOR) 20 mg, oral, Nightly    blood sugar diagnostic (True Metrix Glucose Test Strip) strip USE 1 STRIP Daily    canagliflozin (INVOKANA) 300 mg, oral, Daily RT    cetirizine (ZYRTEC) 10 mg, oral, Daily    clobetasol (Temovate) 0.05 % cream     FreeStyle Bubba 2 Sensor kit USE AS INSTRUCTED    FreeStyle Bubba 3 Sensor device     Januvia 50 mg tablet TAKE 1 TABLET (50 MG) BY MOUTH ONCE DAILY FOR 30 DOSES.    Jardiance 25 mg, oral, Daily    predniSONE 4 mg, oral, Daily    Vitamin D3 50 mcg, oral, Daily        Lab Results   Component Value Date    WBC 8.2 09/13/2023    HGB 12.8 09/13/2023    HCT 42.0 09/13/2023     09/13/2023    CHOL 186 09/13/2023    TRIG 83 09/13/2023    HDL 73.8 09/13/2023    ALT 22 09/13/2023    AST 19 09/13/2023     09/13/2023    K 4.4 09/13/2023     09/13/2023    CREATININE 0.75 09/13/2023    BUN 18 09/13/2023    CO2 29 09/13/2023    TSH 2.82 09/13/2023    HGBA1C 8.0 (H) 07/16/2024     Problem List Items Addressed This Visit             ICD-10-CM    Allergy-induced asthma ([facility]) J45.909     stable         Diabetes mellitus (Multi) E11.9     A1c today is 7.8, was 7.9 previously  Will increase Januvia to 100mg daily  Continue with Jardiance 25mg daily         Relevant Medications    Jardiance 25 mg    SITagliptin phosphate (Januvia) 100 mg tablet    Other Relevant Orders    POCT glycosylated hemoglobin (Hb A1C) manually resulted (Completed)    Dyslipidemia E78.5    Relevant Medications    atorvastatin (Lipitor) 20 mg tablet    Weight loss R63.4     She is not  interested in injectable weight loss medication  States she has been unable to lose weight, would like to try Topamax as her son has had success on it  Start Topamax 25mg daily for 4-5 days and then increase to 50-mg daily, advised patient to call if medication is effective and we will send in prescription for larger supply  Follow up in three months           Relevant Medications    topiramate (Topamax) 25 mg tablet     Other Visit Diagnoses         Codes    Obesity without serious comorbidity, unspecified classification, unspecified obesity type     E66.9    Relevant Medications    topiramate (Topamax) 25 mg tablet        --------------------  Written by Kristi Galindo LPN, acting as a scribe for Dr. Noel. This note accurately reflects the work and decisions made by Dr. Noel.     I, Dr. Noel, attest all medical record entries made by the scribe were under my direction and were personally dictated by me. I have reviewed the chart and agree that the record accurately reflects my performance of the history, physical exam, and assessment and plan.

## 2024-07-30 NOTE — ASSESSMENT & PLAN NOTE
She is not interested in injectable weight loss medication  States she has been unable to lose weight, would like to try Topamax as her son has had success on it  Start Topamax 25mg daily for 4-5 days and then increase to 50-mg daily, advised patient to call if medication is effective and we will send in prescription for larger supply  Follow up in three months

## 2024-07-30 NOTE — ASSESSMENT & PLAN NOTE
A1c today is 7.8, was 7.9 previously  Will increase Januvia to 100mg daily  Continue with Jardiance 25mg daily

## 2024-08-20 ENCOUNTER — PATIENT MESSAGE (OUTPATIENT)
Dept: PRIMARY CARE | Facility: CLINIC | Age: 54
End: 2024-08-20
Payer: COMMERCIAL

## 2024-08-20 DIAGNOSIS — E66.9 OBESITY WITHOUT SERIOUS COMORBIDITY, UNSPECIFIED CLASSIFICATION, UNSPECIFIED OBESITY TYPE: ICD-10-CM

## 2024-08-20 DIAGNOSIS — R63.4 WEIGHT LOSS: ICD-10-CM

## 2024-08-20 RX ORDER — TOPIRAMATE 50 MG/1
50 TABLET, FILM COATED ORAL DAILY
Qty: 90 TABLET | Refills: 0 | Status: SHIPPED | OUTPATIENT
Start: 2024-08-20 | End: 2025-02-16

## 2024-10-17 DIAGNOSIS — E66.9 OBESITY WITHOUT SERIOUS COMORBIDITY: ICD-10-CM

## 2024-10-17 DIAGNOSIS — R63.4 WEIGHT LOSS: ICD-10-CM

## 2024-10-18 RX ORDER — TOPIRAMATE 50 MG/1
50 TABLET, FILM COATED ORAL DAILY
Qty: 90 TABLET | Refills: 0 | Status: SHIPPED | OUTPATIENT
Start: 2024-10-18 | End: 2025-04-16

## 2024-11-04 ENCOUNTER — APPOINTMENT (OUTPATIENT)
Dept: PRIMARY CARE | Facility: CLINIC | Age: 54
End: 2024-11-04
Payer: COMMERCIAL

## 2024-11-04 VITALS
HEIGHT: 64 IN | BODY MASS INDEX: 31.04 KG/M2 | OXYGEN SATURATION: 95 % | RESPIRATION RATE: 16 BRPM | SYSTOLIC BLOOD PRESSURE: 122 MMHG | HEART RATE: 82 BPM | DIASTOLIC BLOOD PRESSURE: 71 MMHG | WEIGHT: 181.8 LBS

## 2024-11-04 DIAGNOSIS — E55.9 VITAMIN D DEFICIENCY: ICD-10-CM

## 2024-11-04 DIAGNOSIS — M35.3 PMR (POLYMYALGIA RHEUMATICA) (MULTI): ICD-10-CM

## 2024-11-04 DIAGNOSIS — J45.909 EXTRINSIC ASTHMA WITHOUT COMPLICATION, UNSPECIFIED ASTHMA SEVERITY, UNSPECIFIED WHETHER PERSISTENT (HHS-HCC): ICD-10-CM

## 2024-11-04 DIAGNOSIS — E78.5 DYSLIPIDEMIA: ICD-10-CM

## 2024-11-04 DIAGNOSIS — E66.09 CLASS 1 OBESITY DUE TO EXCESS CALORIES WITH SERIOUS COMORBIDITY AND BODY MASS INDEX (BMI) OF 32.0 TO 32.9 IN ADULT: ICD-10-CM

## 2024-11-04 DIAGNOSIS — E08.00 DIABETES MELLITUS DUE TO UNDERLYING CONDITION WITH HYPEROSMOLARITY WITHOUT COMA, UNSPECIFIED WHETHER LONG TERM INSULIN USE: ICD-10-CM

## 2024-11-04 DIAGNOSIS — E66.811 CLASS 1 OBESITY DUE TO EXCESS CALORIES WITH SERIOUS COMORBIDITY AND BODY MASS INDEX (BMI) OF 32.0 TO 32.9 IN ADULT: ICD-10-CM

## 2024-11-04 LAB — POC HEMOGLOBIN A1C: 7.2 % (ref 4.2–6.5)

## 2024-11-04 PROCEDURE — 83036 HEMOGLOBIN GLYCOSYLATED A1C: CPT | Performed by: INTERNAL MEDICINE

## 2024-11-04 PROCEDURE — 3008F BODY MASS INDEX DOCD: CPT | Performed by: INTERNAL MEDICINE

## 2024-11-04 PROCEDURE — 99213 OFFICE O/P EST LOW 20 MIN: CPT | Performed by: INTERNAL MEDICINE

## 2024-11-04 PROCEDURE — 3078F DIAST BP <80 MM HG: CPT | Performed by: INTERNAL MEDICINE

## 2024-11-04 PROCEDURE — 3074F SYST BP LT 130 MM HG: CPT | Performed by: INTERNAL MEDICINE

## 2024-11-04 PROCEDURE — 1036F TOBACCO NON-USER: CPT | Performed by: INTERNAL MEDICINE

## 2024-11-04 RX ORDER — METRONIDAZOLE 7.5 MG/G
GEL VAGINAL 2 TIMES DAILY
COMMUNITY
Start: 2024-09-26

## 2024-11-04 RX ORDER — ATORVASTATIN CALCIUM 20 MG/1
20 TABLET, FILM COATED ORAL NIGHTLY
Qty: 90 TABLET | Refills: 1 | Status: SHIPPED | OUTPATIENT
Start: 2024-11-04

## 2024-11-04 ASSESSMENT — PATIENT HEALTH QUESTIONNAIRE - PHQ9
1. LITTLE INTEREST OR PLEASURE IN DOING THINGS: NOT AT ALL
2. FEELING DOWN, DEPRESSED OR HOPELESS: NOT AT ALL
SUM OF ALL RESPONSES TO PHQ9 QUESTIONS 1 AND 2: 0

## 2024-11-04 ASSESSMENT — COLUMBIA-SUICIDE SEVERITY RATING SCALE - C-SSRS
1. IN THE PAST MONTH, HAVE YOU WISHED YOU WERE DEAD OR WISHED YOU COULD GO TO SLEEP AND NOT WAKE UP?: NO
6. HAVE YOU EVER DONE ANYTHING, STARTED TO DO ANYTHING, OR PREPARED TO DO ANYTHING TO END YOUR LIFE?: NO
2. HAVE YOU ACTUALLY HAD ANY THOUGHTS OF KILLING YOURSELF?: NO

## 2024-11-04 ASSESSMENT — PAIN SCALES - GENERAL: PAINLEVEL_OUTOF10: 0-NO PAIN

## 2024-11-04 NOTE — PROGRESS NOTES
"Patient ID: She states she has been feeling ok. She is currently on 1mg Prednisone daily. She states that she only has a little bit of joint pain in the mornings when it is very cold. She states that she does not do much activity other than cleaning her house. She does not work. She states she has lost about 8 pounds according to her scale at home. She feels medication is helping with weight loss.     GIOVANNI Fragoso is a 53 y.o. female with PMH remarkable for PMR, Type 2 DM, dyslipidemia, vitamin D deficiency  who presents to the office today for Diabetes and Flu Vaccine.    HEALTH MAINTENANCE: FOLLOW UP   Last Office Visit: 7/29/24  Mammogram (40-75): 8/12/24 wnl  Pap smear (21-65, or hysterectomy q5yrs): per OB/GYN  Last Labs:4/10/24  Colonoscopy (45-75 or age 40 with 1st degree relative dx colon ca): 7/27/21 normal, Diverticulosis in the sigmoid colon.     Social History     Tobacco Use    Smoking status: Never    Smokeless tobacco: Never   Substance Use Topics    Alcohol use: Not Currently     Comment: occasionally    Drug use: Never     Review of Systems   Constitutional: Negative.    HENT: Negative.     Respiratory: Negative.     Cardiovascular: Negative.    Gastrointestinal: Negative.    Genitourinary: Negative.    Musculoskeletal: Negative.    Skin: Negative.    Neurological: Negative.    Psychiatric/Behavioral: Negative.         Visit Vitals  /71 (BP Location: Left arm)   Pulse 82   Resp 16   Ht 1.626 m (5' 4\")   Wt 82.5 kg (181 lb 12.8 oz)   SpO2 95%   BMI 31.21 kg/m²   OB Status Postmenopausal   Smoking Status Never   BSA 1.93 m²     Physical Exam  Vitals reviewed.   Constitutional:       Appearance: Normal appearance.   HENT:      Head: Normocephalic and atraumatic.   Cardiovascular:      Rate and Rhythm: Normal rate and regular rhythm.      Pulses: Normal pulses.      Heart sounds: Normal heart sounds.   Pulmonary:      Effort: Pulmonary effort is normal.      Breath sounds: Normal breath " sounds.   Abdominal:      General: Bowel sounds are normal.      Palpations: Abdomen is soft.   Musculoskeletal:         General: Normal range of motion.   Skin:     General: Skin is warm and dry.   Neurological:      General: No focal deficit present.      Mental Status: She is alert and oriented to person, place, and time.   Psychiatric:         Mood and Affect: Mood normal.         Behavior: Behavior normal.       Current Outpatient Medications   Medication Instructions    ascorbic acid (VITAMIN C) 500 mg, Daily    atorvastatin (LIPITOR) 20 mg, oral, Nightly    blood sugar diagnostic (True Metrix Glucose Test Strip) strip USE 1 STRIP Daily    cetirizine (ZYRTEC) 10 mg, Daily    clobetasol (Temovate) 0.05 % cream     FreeStyle Bubba 3 Sensor device     Jardiance 25 mg, oral, Daily    metroNIDAZOLE (Metrogel) 0.75 % (37.5mg/5 gram) vaginal gel 2 times daily    predniSONE 4 mg, oral, Daily    SITagliptin phosphate (JANUVIA) 100 mg, oral, Daily    topiramate (TOPAMAX) 25 mg, oral, Daily, Increase to 50mg daily after 4-5 days    topiramate (TOPAMAX) 50 mg, oral, Daily    Vitamin D3 50 mcg, oral, Daily      Lab Results   Component Value Date    WBC 8.2 09/13/2023    HGB 12.8 09/13/2023    HCT 42.0 09/13/2023     09/13/2023    CHOL 186 09/13/2023    TRIG 83 09/13/2023    HDL 73.8 09/13/2023    ALT 22 09/13/2023    AST 19 09/13/2023     09/13/2023    K 4.4 09/13/2023     09/13/2023    CREATININE 0.75 09/13/2023    BUN 18 09/13/2023    CO2 29 09/13/2023    TSH 2.82 09/13/2023    HGBA1C 7.2 (A) 11/04/2024       Problem List Items Addressed This Visit             ICD-10-CM    Allergy-induced asthma ([facility]) J45.909     stable         Diabetes mellitus (Multi) E11.9     A1c is improved today, 7.2  Continue with Januvia and Jardiance         Relevant Orders    POCT glycosylated hemoglobin (Hb A1C) manually resulted (Completed)    Dyslipidemia E78.5     Continue with statin         Relevant Medications     atorvastatin (Lipitor) 20 mg tablet    Vitamin D deficiency E55.9     Continue with vitamin d supplement         Class 1 obesity due to excess calories with serious comorbidity and body mass index (BMI) of 32.0 to 32.9 in adult E66.811, E66.09, Z68.32     She reports she has lost 9 pounds on Topamax         PMR (polymyalgia rheumatica) (Multi) M35.3     She continues on tapering dose of Prednisone  States her pain is significantly improved on Prednisone and she is tolerating tapering dose well  Continue to follow up with rheumatology            --------------------  Written by Kristi Galindo LPN, acting as a scribe for Dr. Noel. This note accurately reflects the work and decisions made by Dr. Noel.     I, Dr. Noel, attest all medical record entries made by the scribe were under my direction and were personally dictated by me. I have reviewed the chart and agree that the record accurately reflects my performance of the history, physical exam, and assessment and plan.

## 2024-11-04 NOTE — PATIENT INSTRUCTIONS
It was great to see you in the office today! Here is what we discussed at your visit today:  Please continue to take your current medications   Follow up in six months

## 2024-11-05 PROBLEM — M35.3 PMR (POLYMYALGIA RHEUMATICA) (MULTI): Status: ACTIVE | Noted: 2024-11-05

## 2024-11-05 ASSESSMENT — ENCOUNTER SYMPTOMS
PSYCHIATRIC NEGATIVE: 1
NEUROLOGICAL NEGATIVE: 1
GASTROINTESTINAL NEGATIVE: 1
RESPIRATORY NEGATIVE: 1
CONSTITUTIONAL NEGATIVE: 1
CARDIOVASCULAR NEGATIVE: 1
MUSCULOSKELETAL NEGATIVE: 1

## 2024-11-06 NOTE — ASSESSMENT & PLAN NOTE
Per patient disregard this message. He emailed his job the COVID results and no longer needs a RTW letter.   She reports she has lost 9 pounds on Topamax

## 2024-11-06 NOTE — ASSESSMENT & PLAN NOTE
She continues on tapering dose of Prednisone  States her pain is significantly improved on Prednisone and she is tolerating tapering dose well  Continue to follow up with rheumatology

## 2024-11-14 DIAGNOSIS — E08.00 DIABETES MELLITUS DUE TO UNDERLYING CONDITION WITH HYPEROSMOLARITY WITHOUT COMA, UNSPECIFIED WHETHER LONG TERM INSULIN USE: ICD-10-CM

## 2024-11-15 RX ORDER — SITAGLIPTIN 100 MG/1
100 TABLET, FILM COATED ORAL DAILY
Qty: 90 TABLET | Refills: 1 | Status: SHIPPED | OUTPATIENT
Start: 2024-11-15

## 2025-01-16 DIAGNOSIS — E55.9 VITAMIN D DEFICIENCY: ICD-10-CM

## 2025-01-17 RX ORDER — OMEGA-3S/DHA/EPA/FISH OIL/D3 300MG-1000
50 CAPSULE ORAL DAILY
Qty: 90 TABLET | Refills: 0 | Status: SHIPPED | OUTPATIENT
Start: 2025-01-17

## 2025-01-30 DIAGNOSIS — R63.4 WEIGHT LOSS: ICD-10-CM

## 2025-01-30 DIAGNOSIS — E66.9 OBESITY WITHOUT SERIOUS COMORBIDITY: ICD-10-CM

## 2025-01-31 RX ORDER — TOPIRAMATE 50 MG/1
50 TABLET, FILM COATED ORAL DAILY
Qty: 90 TABLET | Refills: 0 | Status: SHIPPED | OUTPATIENT
Start: 2025-01-31 | End: 2025-07-30

## 2025-04-09 DIAGNOSIS — E78.5 DYSLIPIDEMIA: ICD-10-CM

## 2025-04-09 RX ORDER — ATORVASTATIN CALCIUM 20 MG/1
20 TABLET, FILM COATED ORAL NIGHTLY
Qty: 90 TABLET | Refills: 1 | Status: SHIPPED | OUTPATIENT
Start: 2025-04-09

## 2025-05-05 ENCOUNTER — APPOINTMENT (OUTPATIENT)
Dept: PRIMARY CARE | Facility: CLINIC | Age: 55
End: 2025-05-05
Payer: COMMERCIAL

## 2025-05-05 ENCOUNTER — OFFICE VISIT (OUTPATIENT)
Dept: PRIMARY CARE | Facility: CLINIC | Age: 55
End: 2025-05-05
Payer: COMMERCIAL

## 2025-05-05 VITALS
HEART RATE: 96 BPM | HEIGHT: 64 IN | RESPIRATION RATE: 16 BRPM | SYSTOLIC BLOOD PRESSURE: 119 MMHG | BODY MASS INDEX: 30.22 KG/M2 | OXYGEN SATURATION: 94 % | WEIGHT: 177 LBS | DIASTOLIC BLOOD PRESSURE: 74 MMHG

## 2025-05-05 DIAGNOSIS — E66.09 CLASS 1 OBESITY DUE TO EXCESS CALORIES WITH SERIOUS COMORBIDITY AND BODY MASS INDEX (BMI) OF 32.0 TO 32.9 IN ADULT: ICD-10-CM

## 2025-05-05 DIAGNOSIS — Z13.29 THYROID DISORDER SCREENING: ICD-10-CM

## 2025-05-05 DIAGNOSIS — E66.811 CLASS 1 OBESITY DUE TO EXCESS CALORIES WITH SERIOUS COMORBIDITY AND BODY MASS INDEX (BMI) OF 32.0 TO 32.9 IN ADULT: ICD-10-CM

## 2025-05-05 DIAGNOSIS — E55.9 VITAMIN D DEFICIENCY: ICD-10-CM

## 2025-05-05 DIAGNOSIS — Z12.31 ENCOUNTER FOR SCREENING MAMMOGRAM FOR MALIGNANT NEOPLASM OF BREAST: ICD-10-CM

## 2025-05-05 DIAGNOSIS — E11.9 TYPE 2 DIABETES MELLITUS WITHOUT COMPLICATION, WITHOUT LONG-TERM CURRENT USE OF INSULIN: ICD-10-CM

## 2025-05-05 DIAGNOSIS — M35.3 PMR (POLYMYALGIA RHEUMATICA) (MULTI): ICD-10-CM

## 2025-05-05 DIAGNOSIS — E78.49 FAMILIAL COMBINED HYPERLIPIDEMIA: ICD-10-CM

## 2025-05-05 DIAGNOSIS — E78.5 DYSLIPIDEMIA: ICD-10-CM

## 2025-05-05 DIAGNOSIS — E53.8 VITAMIN B12 DEFICIENCY: ICD-10-CM

## 2025-05-05 DIAGNOSIS — J45.20 MILD INTERMITTENT EXTRINSIC ASTHMA WITHOUT COMPLICATION (HHS-HCC): ICD-10-CM

## 2025-05-05 LAB — POC HEMOGLOBIN A1C: 7.2 % (ref 4.2–6.5)

## 2025-05-05 PROCEDURE — 3051F HG A1C>EQUAL 7.0%<8.0%: CPT | Performed by: INTERNAL MEDICINE

## 2025-05-05 PROCEDURE — 83036 HEMOGLOBIN GLYCOSYLATED A1C: CPT | Performed by: INTERNAL MEDICINE

## 2025-05-05 PROCEDURE — 99214 OFFICE O/P EST MOD 30 MIN: CPT | Performed by: INTERNAL MEDICINE

## 2025-05-05 PROCEDURE — 3078F DIAST BP <80 MM HG: CPT | Performed by: INTERNAL MEDICINE

## 2025-05-05 PROCEDURE — 3074F SYST BP LT 130 MM HG: CPT | Performed by: INTERNAL MEDICINE

## 2025-05-05 PROCEDURE — 3008F BODY MASS INDEX DOCD: CPT | Performed by: INTERNAL MEDICINE

## 2025-05-05 PROCEDURE — 1036F TOBACCO NON-USER: CPT | Performed by: INTERNAL MEDICINE

## 2025-05-05 ASSESSMENT — COLUMBIA-SUICIDE SEVERITY RATING SCALE - C-SSRS
1. IN THE PAST MONTH, HAVE YOU WISHED YOU WERE DEAD OR WISHED YOU COULD GO TO SLEEP AND NOT WAKE UP?: NO
2. HAVE YOU ACTUALLY HAD ANY THOUGHTS OF KILLING YOURSELF?: NO
6. HAVE YOU EVER DONE ANYTHING, STARTED TO DO ANYTHING, OR PREPARED TO DO ANYTHING TO END YOUR LIFE?: NO

## 2025-05-05 ASSESSMENT — PATIENT HEALTH QUESTIONNAIRE - PHQ9
SUM OF ALL RESPONSES TO PHQ9 QUESTIONS 1 AND 2: 0
1. LITTLE INTEREST OR PLEASURE IN DOING THINGS: NOT AT ALL
2. FEELING DOWN, DEPRESSED OR HOPELESS: NOT AT ALL

## 2025-05-05 ASSESSMENT — PAIN SCALES - GENERAL: PAINLEVEL_OUTOF10: 0-NO PAIN

## 2025-05-05 NOTE — PROGRESS NOTES
"Patient ID: Elizabeth Fragoso is a 54 y.o. female with PMH remarkable for PMR, Type 2 DM, dyslipidemia, vitamin D deficiency  who presents to the office today for follow up.    HEALTH MAINTENANCE: FOLLOW UP   Last Office Visit: 11/4/24  Mammogram (40-75): 8/12/24 wnl  Pap smear (21-65, or hysterectomy q5yrs): per OB/GYN  Last Labs: 3/31/25, A1c on 1/22/25 was 7.7  Colonoscopy (45-75 or age 40 with 1st degree relative dx colon ca): 7/27/21 normal, Diverticulosis in the sigmoid colon.     --seen by rheumatology on 04/03/25 and per that note, no joint pain, finished Prednisone taper and taking Methotrexate    --seen by endocrinology on 04/29/25 and per that note, continue on Jardiance and Trulicity(Dose increased to 4.5mg)    HPI She states she is feeling ok. She states that she had stopped Prednisone, but after being off of it, her pain returned and she is now back on Prednisone 5mg daily per rheumatology. She states Topamax was ineffective for weight loss and thus she stopped taking medication. She states she is tolerating Trulicity ok.     Social History[1]    Review of Systems   Constitutional: Negative.    HENT: Negative.     Respiratory: Negative.     Cardiovascular: Negative.    Gastrointestinal: Negative.    Genitourinary: Negative.    Musculoskeletal: Negative.    Neurological: Negative.    Psychiatric/Behavioral: Negative.       Visit Vitals  Vitals:    05/05/25 1303   BP: 119/74   BP Location: Right arm   Pulse: 96   Resp: 16   SpO2: 94%   Weight: 80.3 kg (177 lb)   Height: 1.626 m (5' 4\")      OB Status Postmenopausal   Smoking Status Never     Physical Exam  Vitals reviewed.   Constitutional:       Appearance: Normal appearance.   HENT:      Head: Normocephalic and atraumatic.   Cardiovascular:      Rate and Rhythm: Normal rate and regular rhythm.      Pulses: Normal pulses.      Heart sounds: Normal heart sounds.   Pulmonary:      Effort: Pulmonary effort is normal.      Breath sounds: Normal breath " sounds.   Abdominal:      General: Bowel sounds are normal.      Palpations: Abdomen is soft.   Musculoskeletal:         General: Normal range of motion.      Cervical back: Normal range of motion.   Skin:     General: Skin is warm and dry.   Neurological:      General: No focal deficit present.      Mental Status: She is alert and oriented to person, place, and time.   Psychiatric:         Mood and Affect: Mood normal.         Behavior: Behavior normal.         Current Outpatient Medications   Medication Instructions    ascorbic acid (VITAMIN C) 500 mg, Daily    atorvastatin (LIPITOR) 20 mg, oral, Nightly    blood sugar diagnostic (True Metrix Glucose Test Strip) strip USE 1 STRIP Daily    cetirizine (ZYRTEC) 10 mg, Daily    clobetasol (Temovate) 0.05 % cream     FreeStyle Bubba 3 Sensor device     Januvia 100 mg, oral, Daily    Jardiance 25 mg, oral, Daily    metroNIDAZOLE (Metrogel) 0.75 % (37.5mg/5 gram) vaginal gel 2 times daily    predniSONE 4 mg, oral, Daily    topiramate (TOPAMAX) 25 mg, oral, Daily, Increase to 50mg daily after 4-5 days    topiramate (TOPAMAX) 50 mg, oral, Daily    Vitamin D3 50 mcg, oral, Daily      Lab Results   Component Value Date    WBC 8.2 09/13/2023    HGB 12.8 09/13/2023    HCT 42.0 09/13/2023     09/13/2023    CHOL 186 09/13/2023    TRIG 83 09/13/2023    HDL 73.8 09/13/2023    ALT 22 09/13/2023    AST 19 09/13/2023     09/13/2023    K 4.4 09/13/2023     09/13/2023    CREATININE 0.75 09/13/2023    BUN 18 09/13/2023    CO2 29 09/13/2023    TSH 2.82 09/13/2023    HGBA1C 7.7 (H) 01/22/2025       Problem List Items Addressed This Visit           ICD-10-CM    Allergy-induced asthma (HHS-HCC) J45.909    stable         Diabetes mellitus (Multi) E11.9    A1c is improved today, 7.2  She is now seeing endocrinology  Continue with Trulicity and Jardiance         Relevant Orders    POCT glycosylated hemoglobin (Hb A1C) manually resulted (Completed)    Dyslipidemia E78.5     Continue with statin         Relevant Orders    Lipid panel    Familial combined hyperlipidemia E78.49    Relevant Orders    Lipid panel    Vitamin B12 deficiency E53.8    Relevant Orders    Vitamin B12    Vitamin D deficiency E55.9    Relevant Orders    Vitamin D 25-Hydroxy,Total (for eval of Vitamin D levels)    Class 1 obesity due to excess calories with serious comorbidity and body mass index (BMI) of 32.0 to 32.9 in adult E66.811, E66.09, Z68.32    She states Topamax was ineffective, and she has stopped taking medication  She is now followed by rachel, currently taking Trulicity and has lost a few pounds since starting this medication  - having a BMI of >30 kg/m2, mortality rates from all causes, and especially from cardiovascular disease increase by %.  - obesity increases your risk for diabetes, carotid artery disease, congestive heart failure, colon cancer, breast cancer, endometrial cancer, gallbladder caner, OA, sleep apnea, in addition to several other health issues.  - for every 2lb weight reduction, the systolic blood pressure drops almost 1 pt!           PMR (polymyalgia rheumatica) (Multi) M35.3    Continue to follow up with rheumatology  Currently on Methotrexate and Prednisone 5mg daily  She states she developed pain after being off Prednisone for a couple weeks and thus rheumatology started her back on low dose          Other Visit Diagnoses         Codes      Thyroid disorder screening     Z13.29    Relevant Orders    Tsh With Reflex To Free T4 If Abnormal      Encounter for screening mammogram for malignant neoplasm of breast     Z12.31    Relevant Orders    BI mammo bilateral screening tomosynthesis            --------------------  Written by Marcy Galindo RN, acting as a scribe for Dr. Noel. This note accurately reflects the work and decisions made by Dr. Noel.     I, Dr. Noel, attest all medical record entries made by the scribe were under my direction and were personally dictated by me. I  have reviewed the chart and agree that the record accurately reflects my performance of the history, physical exam, and assessment and plan.          [1]   Social History  Tobacco Use    Smoking status: Never    Smokeless tobacco: Never   Substance Use Topics    Alcohol use: Not Currently     Comment: occasionally    Drug use: Never

## 2025-05-06 ASSESSMENT — ENCOUNTER SYMPTOMS
RESPIRATORY NEGATIVE: 1
GASTROINTESTINAL NEGATIVE: 1
MUSCULOSKELETAL NEGATIVE: 1
PSYCHIATRIC NEGATIVE: 1
CARDIOVASCULAR NEGATIVE: 1
NEUROLOGICAL NEGATIVE: 1
CONSTITUTIONAL NEGATIVE: 1

## 2025-05-07 NOTE — ASSESSMENT & PLAN NOTE
She states Topamax was ineffective, and she has stopped taking medication  She is now followed by rachel, currently taking Trulicity and has lost a few pounds since starting this medication  - having a BMI of >30 kg/m2, mortality rates from all causes, and especially from cardiovascular disease increase by %.  - obesity increases your risk for diabetes, carotid artery disease, congestive heart failure, colon cancer, breast cancer, endometrial cancer, gallbladder caner, OA, sleep apnea, in addition to several other health issues.  - for every 2lb weight reduction, the systolic blood pressure drops almost 1 pt!

## 2025-05-07 NOTE — ASSESSMENT & PLAN NOTE
Continue to follow up with rheumatology  Currently on Methotrexate and Prednisone 5mg daily  She states she developed pain after being off Prednisone for a couple weeks and thus rheumatology started her back on low dose

## 2025-05-07 NOTE — ASSESSMENT & PLAN NOTE
A1c is improved today, 7.2  She is now seeing endocrinology  Continue with Trulicity and Jardiance

## 2025-05-07 NOTE — PATIENT INSTRUCTIONS
It was great to see you in the office today! Here is what we discussed at your visit today:    Please continue to take your current medications      We have ordered additional bloodwork to be drawn when you go for your next bloodwork  You need to be FASTING for 12 hours prior to blood draw.  Please contact your insurance company to ask about the best location to get blood drawn.  We will contact you with the results of your blood work and any necessary adjustments to your plan of care.  Iif you do not hear from us within 3-5 days of having your blood drawn, please call the [facility] office at [phone].    I have ordered you a mammogram to be done as soon as you are able.  Someone will call you soon to schedule this.   We will call the results.    Follow up in four months

## 2025-05-16 DIAGNOSIS — J30.89 NON-SEASONAL ALLERGIC RHINITIS DUE TO OTHER ALLERGIC TRIGGER: ICD-10-CM

## 2025-05-16 DIAGNOSIS — J45.909 EXTRINSIC ASTHMA WITHOUT COMPLICATION, UNSPECIFIED ASTHMA SEVERITY, UNSPECIFIED WHETHER PERSISTENT (HHS-HCC): ICD-10-CM

## 2025-05-16 RX ORDER — CETIRIZINE HYDROCHLORIDE 10 MG/1
10 TABLET ORAL DAILY
Qty: 30 TABLET | Refills: 1 | Status: SHIPPED | OUTPATIENT
Start: 2025-05-16

## 2025-08-11 DIAGNOSIS — E55.9 VITAMIN D DEFICIENCY: ICD-10-CM

## 2025-08-11 RX ORDER — OMEGA-3S/DHA/EPA/FISH OIL/D3 300MG-1000
50 CAPSULE ORAL DAILY
Qty: 90 TABLET | Refills: 0 | Status: SHIPPED | OUTPATIENT
Start: 2025-08-11

## 2025-08-15 ENCOUNTER — APPOINTMENT (OUTPATIENT)
Dept: RADIOLOGY | Facility: CLINIC | Age: 55
End: 2025-08-15
Payer: COMMERCIAL

## 2025-11-10 ENCOUNTER — APPOINTMENT (OUTPATIENT)
Dept: PRIMARY CARE | Facility: CLINIC | Age: 55
End: 2025-11-10
Payer: COMMERCIAL